# Patient Record
Sex: MALE | Race: WHITE | Employment: FULL TIME | URBAN - METROPOLITAN AREA
[De-identification: names, ages, dates, MRNs, and addresses within clinical notes are randomized per-mention and may not be internally consistent; named-entity substitution may affect disease eponyms.]

---

## 2017-02-21 ENCOUNTER — OFFICE VISIT (OUTPATIENT)
Dept: FAMILY MEDICINE | Facility: CLINIC | Age: 57
End: 2017-02-21
Payer: COMMERCIAL

## 2017-02-21 VITALS
OXYGEN SATURATION: 98 % | SYSTOLIC BLOOD PRESSURE: 116 MMHG | DIASTOLIC BLOOD PRESSURE: 80 MMHG | HEART RATE: 69 BPM | TEMPERATURE: 97.9 F | WEIGHT: 185 LBS | HEIGHT: 73 IN | RESPIRATION RATE: 20 BRPM | BODY MASS INDEX: 24.52 KG/M2

## 2017-02-21 DIAGNOSIS — I47.10 SVT (SUPRAVENTRICULAR TACHYCARDIA) (H): ICD-10-CM

## 2017-02-21 DIAGNOSIS — I10 HYPERTENSION GOAL BP (BLOOD PRESSURE) < 140/90: ICD-10-CM

## 2017-02-21 DIAGNOSIS — I10 HYPERTENSION GOAL BP (BLOOD PRESSURE) < 140/90: Primary | ICD-10-CM

## 2017-02-21 DIAGNOSIS — R12 HEARTBURN: ICD-10-CM

## 2017-02-21 DIAGNOSIS — Z00.00 ROUTINE GENERAL MEDICAL EXAMINATION AT A HEALTH CARE FACILITY: Primary | ICD-10-CM

## 2017-02-21 DIAGNOSIS — Z23 NEED FOR TDAP VACCINATION: ICD-10-CM

## 2017-02-21 DIAGNOSIS — Z13.6 CARDIOVASCULAR SCREENING; LDL GOAL LESS THAN 130: ICD-10-CM

## 2017-02-21 PROCEDURE — 80061 LIPID PANEL: CPT | Performed by: FAMILY MEDICINE

## 2017-02-21 PROCEDURE — 82043 UR ALBUMIN QUANTITATIVE: CPT | Performed by: FAMILY MEDICINE

## 2017-02-21 PROCEDURE — 80048 BASIC METABOLIC PNL TOTAL CA: CPT | Performed by: FAMILY MEDICINE

## 2017-02-21 PROCEDURE — 36415 COLL VENOUS BLD VENIPUNCTURE: CPT | Performed by: FAMILY MEDICINE

## 2017-02-21 PROCEDURE — 90715 TDAP VACCINE 7 YRS/> IM: CPT | Performed by: FAMILY MEDICINE

## 2017-02-21 PROCEDURE — 99386 PREV VISIT NEW AGE 40-64: CPT | Mod: 25 | Performed by: FAMILY MEDICINE

## 2017-02-21 PROCEDURE — 90471 IMMUNIZATION ADMIN: CPT | Performed by: FAMILY MEDICINE

## 2017-02-21 RX ORDER — DILTIAZEM HYDROCHLORIDE 300 MG/1
300 CAPSULE, COATED, EXTENDED RELEASE ORAL DAILY
Qty: 90 CAPSULE | Refills: 3 | Status: CANCELLED | OUTPATIENT
Start: 2017-02-21

## 2017-02-21 RX ORDER — DILTIAZEM HYDROCHLORIDE 300 MG/1
300 CAPSULE, COATED, EXTENDED RELEASE ORAL DAILY
Qty: 90 CAPSULE | Refills: 3 | Status: SHIPPED | OUTPATIENT
Start: 2017-02-21 | End: 2018-02-17

## 2017-02-21 RX ORDER — LISINOPRIL 30 MG/1
30 TABLET ORAL DAILY
Qty: 30 TABLET | Refills: 3 | Status: CANCELLED | OUTPATIENT
Start: 2017-02-21

## 2017-02-21 RX ORDER — LISINOPRIL 30 MG/1
30 TABLET ORAL DAILY
Qty: 90 TABLET | Refills: 3 | Status: SHIPPED | OUTPATIENT
Start: 2017-02-21 | End: 2018-02-17

## 2017-02-21 NOTE — NURSING NOTE
"Chief Complaint   Patient presents with     Recheck Medication       Initial /80 (BP Location: Right arm, Cuff Size: Adult Large)  Pulse 69  Temp 97.9  F (36.6  C) (Oral)  Resp 20  Ht 6' 0.5\" (1.842 m)  Wt 185 lb (83.9 kg)  SpO2 98%  BMI 24.75 kg/m2 Estimated body mass index is 24.75 kg/(m^2) as calculated from the following:    Height as of this encounter: 6' 0.5\" (1.842 m).    Weight as of this encounter: 185 lb (83.9 kg).  Medication Reconciliation: complete     Sneha aPng MA      "

## 2017-02-21 NOTE — MR AVS SNAPSHOT
After Visit Summary   2/21/2017    Robin Olmedo    MRN: [id number]           Patient Information     Date Of Birth          1960        Visit Information        Provider Department      2/21/2017 12:20 PM Wegener, Joel Daniel Irwin, MD [facility]        Care Instructions    Htn/svt both well controlled.      Check labs/cholesterol today.     Follow up one year for yearly physical.     Had colonoscopy age 50.      Also using one daily aspirin.     tdap today.     ASSESSMENT AND PLAN  There are no diagnoses linked to this encounter.      [facility] SIGNUP FOR E-VISITS AND EASIER COMMUNICATION:  [email or web address]     Zipnosis:  [email or web address].  Sign up for e-visits for common illnesses!     RADIOLOGY:   [facility]:  [phone] to schedule any radiology tests at [facility]: [phone]    Mammograms/colonoscopies:  [phone]    CONSUMER PRICE LINE for estimates of test costs:  [phone]               Follow-ups after your visit        Who to contact     If you have questions or need follow up information about today's clinic visit or your schedule please contact [facility] directly at [phone].  Normal or non-critical lab and imaging results will be communicated to you by Medicasthart, letter or phone within 4 business days after the clinic has received the results. If you do not hear from us within 7 days, please contact the clinic through Medicasthart or phone. If you have a critical or abnormal lab result, we will notify you by phone as soon as possible.  Submit refill requests through Designer Material or call your pharmacy and they will forward the refill request to us. Please allow 3 business days for your refill to be completed.          Additional Information About Your Visit        MedicastharTalicious Information     Designer Material lets you send messages to your doctor, view your test results, renew your prescriptions, schedule appointments and  "more. To sign up, go to [email or web address] . Click on \"Log in\" on the left side of the screen, which will take you to the Welcome page. Then click on \"Sign up Now\" on the right side of the page.     You will be asked to enter the access code listed below, as well as some personal information. Please follow the directions to create your username and password.     Your access code is: [email or web address]  Expires: 2017  1:08 PM     Your access code will  in 90 days. If you need help or a new code, please call your [facility] clinic or [phone].        Care EveryWhere ID     This is your Care EveryWhere ID. This could be used by other organizations to access your [facility] medical records  [id number]        Your Vitals Were     Pulse Temperature Respirations Height Pulse Oximetry BMI (Body Mass Index)    69 97.9  F (36.6  C) (Oral) 20 6' 0.5\" (1.842 m) 98% 24.75 kg/m2       Blood Pressure from Last 3 Encounters:   17 116/80   12/14/15 127/81   12 133/85    Weight from Last 3 Encounters:   17 185 lb (83.9 kg)   12/14/15 188 lb (85.3 kg)   12 185 lb (83.9 kg)              Today, you had the following     No orders found for display       Primary Care Provider Office Phone # Fax #    Cheo Prado [phone]       [facility] [address] [facility] [address]        Thank you!     Thank you for choosing [facility]  for your care. Our goal is always to provide you with excellent care. Hearing back from our patients is one way we can continue to improve our services. Please take a few minutes to complete the written survey that you may receive in the mail after your visit with us. Thank you!             Your Updated Medication List - Protect others around you: Learn how to safely use, store and throw away your medicines at www.disposemymeds.org.          This list is accurate as of: 17  1:09 PM.  Always use your most recent med " list.                   Brand Name Dispense Instructions for use    aspirin 81 MG chewable tablet      Take 81 mg by mouth daily Reported on 2/21/2017       diazepam 5 MG tablet    VALIUM    6 tablet    Take 1 tablet (5 mg) by mouth nightly as needed for anxiety or sleep       DILTIAZEM HCL CR PO      Take 300 mg by mouth daily Reported on 2/21/2017       docusate sodium 100 MG capsule    COLACE    100 capsule    Take 1 capsule by mouth 2 times daily.       * HYDROcodone-acetaminophen 5-325 MG per tablet    NORCO    30 tablet    Take 1-2 tablets by mouth every 4 hours as needed for other (Moderate to Severe Pain).       * HYDROcodone-acetaminophen 5-325 MG per tablet    NORCO    30 tablet    Take 1-2 tablets by mouth every 6 hours as needed.       IBUPROFEN PO      Take 600 mg by mouth every 8 hours as needed Reported on 2/21/2017       levofloxacin 500 MG tablet    LEVAQUIN    10 tablet    Take 1 tablet by mouth daily.       LISINOPRIL PO      Take 30 mg by mouth once       LORazepam 1 MG tablet    ATIVAN    12 tablet    Take 0.5-1 tablets (0.5-1 mg) by mouth every 8 hours as needed for anxiety Take 30 minutes prior to departure.  Do not operate a vehicle after taking this medication       RANITIDINE HCL PO      Take 150 mg by mouth as needed for heartburn       TYLENOL 325 MG tablet   Generic drug:  acetaminophen      Take 1-2 tablets by mouth every 6 hours as needed Reported on 2/21/2017       * Notice:  This list has 2 medication(s) that are the same as other medications prescribed for you. Read the directions carefully, and ask your doctor or other care provider to review them with you.

## 2017-02-21 NOTE — TELEPHONE ENCOUNTER
Patient was seen today and was expecting his blood pressure meds to be sent in  Is leaving [facility] tomorrow and will run out if not sent in TODAY  Meds pended,  Patient is expecting a call back when refills have been sent.  Thao Currie RN

## 2017-02-21 NOTE — TELEPHONE ENCOUNTER
Reason for Call:  Medication or medication refill:    Do you use a [facility]?  Name of the pharmacy and phone number for the current request: Pharmacy loaded     Name of the medication requested: RANITIDINE HCL PO,LISINOPRIL PO     Other request: Pt was seen today and stated Dr. Wegener was going to fill these meds. Pt was incredibly rude to writer and very demanding. Writer eventually hung up on pt. Pt stated he needs these meds filled TODAY!    Can we leave a detailed message on this number? YES    Phone number patient can be reached at: Cell number on file:    Telephone Information:   Mobile [phone]       Best Time: anytime    Call taken on 2/21/2017 at 3:51 PM by Taryn Caal

## 2017-02-21 NOTE — PATIENT INSTRUCTIONS
Htn/svt both well controlled.      Check labs/cholesterol today.     Follow up one year for yearly physical.     Had colonoscopy age 50.      Also using one daily aspirin.     tdap today.     ASSESSMENT AND PLAN  There are no diagnoses linked to this encounter.      MYCHART SIGNUP FOR E-VISITS AND EASIER COMMUNICATION:  [email or web address]     Zipnosis:  [place].[employer] [place] [employer].Contapps.  Sign up for e-visits for common illnesses!     RADIOLOGY:   [facility]:  [phone] to schedule any radiology tests at [facility]: [phone]    Mammograms/colonoscopies:  [phone]    CONSUMER PRICE LINE for estimates of test costs:  [phone]

## 2017-02-21 NOTE — NURSING NOTE
Screening Questionnaire for Adult Immunization     Are you sick today?   No    Do you have allergies to medications, food or any vaccine?   No    Have you ever had a serious reaction after receiving a vaccination?   No    Do you have a long-term health problem with heart disease, lung disease,  asthma, kidney disease, diabetes, anemia, metabolic or blood disease?   No    Do you have cancer, leukemia, AIDS, or any immune system problem?   No    Do you take cortisone, prednisone, other steroids, or anticancer drugs, or  have you had any x-ray (radiation) treatments?   No    Have you had a seizure, brain, or other nervous system problem?   No    During the past year, have you received a transfusion of blood or blood       products, or been given a medicine called immune (gamma) globulin?   No    For women: Are you pregnant or is there a chance you could become         pregnant during the next month?   No    Have you received any vaccinations in the past 4 weeks?   No     Immunization questionnaire answers were all negative.      MNVFC doesn't apply on this patient    Per orders of Dr. Wegener, injection of tdap given by Earnestine Yang. Patient instructed to remain in clinic for 20 minutes afterwards, and to report any adverse reaction to me immediately.    Prior to injection verified patient identity using patient's name and date of birth.         Screening performed by Earnestine Yang, CMA

## 2017-02-22 LAB
ANION GAP SERPL CALCULATED.3IONS-SCNC: 11 MMOL/L (ref 3–14)
BUN SERPL-MCNC: 18 MG/DL (ref 7–30)
CALCIUM SERPL-MCNC: 9.2 MG/DL (ref 8.5–10.1)
CHLORIDE SERPL-SCNC: 105 MMOL/L (ref 94–109)
CHOLEST SERPL-MCNC: 209 MG/DL
CO2 SERPL-SCNC: 24 MMOL/L (ref 20–32)
CREAT SERPL-MCNC: 0.92 MG/DL (ref 0.66–1.25)
CREAT UR-MCNC: 156 MG/DL
GFR SERPL CREATININE-BSD FRML MDRD: 85 ML/MIN/1.7M2
GLUCOSE SERPL-MCNC: 88 MG/DL (ref 70–99)
HDLC SERPL-MCNC: 75 MG/DL
LDLC SERPL CALC-MCNC: 120 MG/DL
MICROALBUMIN UR-MCNC: 12 MG/L
MICROALBUMIN/CREAT UR: 7.76 MG/G CR (ref 0–17)
NONHDLC SERPL-MCNC: 134 MG/DL
POTASSIUM SERPL-SCNC: 4.4 MMOL/L (ref 3.4–5.3)
SODIUM SERPL-SCNC: 140 MMOL/L (ref 133–144)
TRIGL SERPL-MCNC: 71 MG/DL

## 2017-02-23 PROBLEM — Z13.6 CARDIOVASCULAR SCREENING; LDL GOAL LESS THAN 130: Status: ACTIVE | Noted: 2017-02-23

## 2017-02-23 PROBLEM — R12 HEARTBURN: Status: ACTIVE | Noted: 2017-02-23

## 2017-02-23 PROBLEM — I47.10 SVT (SUPRAVENTRICULAR TACHYCARDIA) (H): Status: ACTIVE | Noted: 2017-02-23

## 2017-02-23 PROBLEM — I10 HYPERTENSION GOAL BP (BLOOD PRESSURE) < 140/90: Status: ACTIVE | Noted: 2017-02-23

## 2017-02-23 NOTE — PROGRESS NOTES
SUBJECTIVE:     CC: Robin Olmedo is an 56 year old male who presents for preventative health visit.     Healthy Habits:    Do you get at least three servings of calcium containing foods daily (dairy, green leafy vegetables, etc.)? yes    Amount of exercise or daily activities, outside of work: 6-7 day(s) per week    Problems taking medications regularly No    Medication side effects: No    Have you had an eye exam in the past two years? no    Do you see a dentist twice per year? yes    Do you have sleep apnea, excessive snoring or daytime drowsiness?no        -------------------------------------    1.  Htn:   On two medications, working well overall for him.   2.  SVT:  Previous electrocardiology evaluation and told ablation would be very unlikely to be helpful.  Symptoms quite well controlled with diltiazem and occasional valsalva.   3.  Gerd:  Infrequent ranitidine use.     Quite healthy, biking over 3, 000 miles/year now!     Works in [employer] at [employer], known to me (dr. Wegener) for long time.     Today's PHQ-2 Score:   PHQ-2 ( 1999 Pfizer) 2/21/2017 12/14/2015   Q1: Little interest or pleasure in doing things 0 0   Q2: Feeling down, depressed or hopeless 0 0   PHQ-2 Score 0 0       Abuse: Current or Past(Physical, Sexual or Emotional)- No  Do you feel safe in your environment - Yes    Social History   Substance Use Topics     Smoking status: Never Smoker     Smokeless tobacco: Not on file     Alcohol use Yes      Comment: 2 drinks Daily     The patient does not drink >3 drinks per day nor >7 drinks per week.    Last PSA: No results found for: PSA    Recent Labs   Lab Test  02/21/17   1315   CHOL  209*   HDL  75   LDL  120*   TRIG  71   NHDL  134*       Reviewed orders with patient. Reviewed health maintenance and updated orders accordingly - Yes    All Histories reviewed and updated in Epic.  Past Medical History   Diagnosis Date     Hypertension      SVT (supraventricular tachycardia) (H)       Past  "Surgical History   Procedure Laterality Date     Orthopedic surgery       Open reduction internal fixation wrist  6/17/2011     Procedure:OPEN REDUCTION INTERNAL FIXATION WRIST; Surgeon:TATIANA CASTRO; [facility]     Arthroscopy wrist  6/17/2011     Procedure:ARTHROSCOPY WRIST; Surgeon:TATIANA CASTRO; [facility]     Remove hardware wrist  1/12/2012     Procedure:REMOVE HARDWARE WRIST; Hardware Removal Left Wrist, Plate x 2, Screw  x 10 ; Surgeon:TATIANA CASTRO; Location:[facility]       ROS:  C: NEGATIVE for fever, chills, change in weight  I: NEGATIVE for worrisome rashes, moles or lesions  E: NEGATIVE for vision changes or irritation  ENT: NEGATIVE for ear, mouth and throat problems  R: NEGATIVE for significant cough or SOB  CV: NEGATIVE for chest pain, palpitations or peripheral edema  GI: NEGATIVE for nausea, abdominal pain, heartburn, or change in bowel habits   male: negative for dysuria, hematuria, decreased urinary stream, erectile dysfunction, urethral discharge  M: NEGATIVE for significant arthralgias or myalgia  N: NEGATIVE for weakness, dizziness or paresthesias  P: NEGATIVE for changes in mood or affect    Problem list, Medication list, Allergies, and Medical/Social/Surgical histories reviewed in [facility] and updated as appropriate.  OBJECTIVE:     /80 (BP Location: Right arm, Cuff Size: Adult Large)  Pulse 69  Temp 97.9  F (36.6  C) (Oral)  Resp 20  Ht 6' 0.5\" (1.842 m)  Wt 185 lb (83.9 kg)  SpO2 98%  BMI 24.75 kg/m2  EXAM:  GENERAL: healthy, alert and no distress  EYES: Eyes grossly normal to inspection, PERRL and conjunctivae and sclerae normal  HENT: ear canals and TM's normal, nose and mouth without ulcers or lesions  NECK: no adenopathy, no asymmetry, masses, or scars and thyroid normal to palpation  RESP: lungs clear to auscultation - no rales, rhonchi or wheezes  CV: regular rate and rhythm, normal S1 S2, no S3 or S4, no murmur, click or rub, no peripheral " edema and peripheral pulses strong  ABDOMEN: soft, nontender, no hepatosplenomegaly, no masses and bowel sounds normal  MS: no gross musculoskeletal defects noted, no edema  SKIN: no suspicious lesions or rashes  NEURO: Normal strength and tone, mentation intact and speech normal  PSYCH: mentation appears normal, affect normal/bright    ASSESSMENT/PLAN:       ASSESSMENT AND PLAN  1. Routine general medical examination at a health care facility  Due for tdap will do today.  Had colonoscopy (normal no polyps) at age 50.  Does not desire psa or digital exam screening, very knowledgeable about this issue. Consider second hep a if travels since we have only one on record in care everywhere.     2. Hypertension goal BP (blood pressure) < 140/90  Controlled.  Labs for eval for ckd today.  No changes, refills.   (sending prescriptions got delayed due to being entered incorrectly by rooming staff although I had told him I would send right away)  See telephone notes from today.     - Basic metabolic panel  - Albumin Random Urine Quantitative  - lisinopril (PRINIVIL,ZESTRIL) 30 MG tablet; Take 1 tablet (30 mg) by mouth daily  Dispense: 90 tablet; Refill: 3  - diltiazem (DILTIAZEM CD) 300 MG 24 hr capsule; Take 1 capsule (300 mg) by mouth daily  Dispense: 90 capsule; Refill: 3    3. Need for Tdap vaccination    - TDAP (ADACEL AGES 11-64)  - ADMIN 1st VACCINE    4. Heartburn  Refills   - ranitidine (ZANTAC) 150 MG tablet; Take 1 tablet (150 mg) by mouth 2 times daily  Dispense: 180 tablet; Refill: 3    5. SVT (supraventricular tachycardia) (H)  No changes.  If troublesome symptoms will let me know.     6. CARDIOVASCULAR SCREENING; LDL GOAL LESS THAN 130  Today.   - Lipid panel reflex to direct LDL    Discussed use of Kogn[employer], e-visits and zipnosis, signed care everywhere RELEASE OF INFORMATION.     LendstarT SIGNUP FOR E-VISITS AND EASIER COMMUNICATION:  [email or web address]     Zipnosis:  [email or web address].  Sign up  "for e-visits for common illnesses!     RADIOLOGY:   [facility]:  [phone] to schedule any radiology tests at [facility]: [phone]    Mammograms/colonoscopies:  [phone]    CONSUMER PRICE LINE for estimates of test costs:  [phone]         COUNSELING:  Reviewed preventive health counseling, as reflected in patient instructions       Regular exercise       Healthy diet/nutrition       Colon cancer screening       Prostate cancer screening         reports that he has never smoked. He does not have any smokeless tobacco history on file.    Estimated body mass index is 24.75 kg/(m^2) as calculated from the following:    Height as of this encounter: 6' 0.5\" (1.842 m).    Weight as of this encounter: 185 lb (83.9 kg).       Counseling Resources:  ATP IV Guidelines  Pooled Cohorts Equation Calculator  FRAX Risk Assessment  ICSI Preventive Guidelines  Dietary Guidelines for Americans, 2010  USDA's MyPlate  ASA Prophylaxis  Lung CA Screening    Joel Daniel Wegener, MD  [facility]  "

## 2017-06-19 DIAGNOSIS — Z71.84 TRAVEL ADVICE ENCOUNTER: Primary | ICD-10-CM

## 2017-06-19 RX ORDER — LORAZEPAM 1 MG/1
0.5-1 TABLET ORAL EVERY 8 HOURS PRN
Qty: 10 TABLET | Refills: 0 | Status: SHIPPED | OUTPATIENT
Start: 2017-06-19 | End: 2018-12-14

## 2018-07-20 DIAGNOSIS — Z29.89 ALTITUDE SICKNESS PROPHYLAXIS: Primary | ICD-10-CM

## 2018-07-20 RX ORDER — ACETAZOLAMIDE 125 MG/1
125 TABLET ORAL 2 TIMES DAILY
Qty: 30 TABLET | Refills: 1 | Status: SHIPPED | OUTPATIENT
Start: 2018-07-20 | End: 2018-12-14

## 2018-12-14 ENCOUNTER — APPOINTMENT (OUTPATIENT)
Dept: GENERAL RADIOLOGY | Facility: CLINIC | Age: 58
End: 2018-12-14
Attending: EMERGENCY MEDICINE
Payer: COMMERCIAL

## 2018-12-14 ENCOUNTER — HOSPITAL ENCOUNTER (EMERGENCY)
Facility: CLINIC | Age: 58
Discharge: HOME | End: 2018-12-14
Attending: EMERGENCY MEDICINE | Admitting: EMERGENCY MEDICINE
Payer: COMMERCIAL

## 2018-12-14 VITALS
TEMPERATURE: 98.4 F | RESPIRATION RATE: 14 BRPM | OXYGEN SATURATION: 98 % | WEIGHT: 185 LBS | DIASTOLIC BLOOD PRESSURE: 77 MMHG | BODY MASS INDEX: 24.52 KG/M2 | HEART RATE: 66 BPM | SYSTOLIC BLOOD PRESSURE: 125 MMHG | HEIGHT: 73 IN

## 2018-12-14 DIAGNOSIS — S61.411A LACERATION OF RIGHT HAND WITHOUT FOREIGN BODY, INITIAL ENCOUNTER: ICD-10-CM

## 2018-12-14 PROCEDURE — 12001 RPR S/N/AX/GEN/TRNK 2.5CM/<: CPT | Mod: Z6 | Performed by: EMERGENCY MEDICINE

## 2018-12-14 PROCEDURE — 73130 X-RAY EXAM OF HAND: CPT | Mod: RT

## 2018-12-14 PROCEDURE — 25000125 ZZHC RX 250

## 2018-12-14 PROCEDURE — 99283 EMERGENCY DEPT VISIT LOW MDM: CPT | Mod: 25 | Performed by: EMERGENCY MEDICINE

## 2018-12-14 PROCEDURE — 99283 EMERGENCY DEPT VISIT LOW MDM: CPT

## 2018-12-14 PROCEDURE — 12001 RPR S/N/AX/GEN/TRNK 2.5CM/<: CPT

## 2018-12-14 RX ORDER — LIDOCAINE HYDROCHLORIDE AND EPINEPHRINE 10; 10 MG/ML; UG/ML
INJECTION, SOLUTION INFILTRATION; PERINEURAL
Status: COMPLETED
Start: 2018-12-14 | End: 2018-12-14

## 2018-12-14 RX ORDER — MAGNESIUM HYDROXIDE 1200 MG/15ML
LIQUID ORAL
Status: COMPLETED
Start: 2018-12-14 | End: 2018-12-14

## 2018-12-14 RX ADMIN — HYDROCODONE BITARTRATE AND ACETAMINOPHEN 100 ML: 500; 5 TABLET ORAL at 22:04

## 2018-12-14 RX ADMIN — LIDOCAINE HYDROCHLORIDE AND EPINEPHRINE: 10; 10 INJECTION, SOLUTION INFILTRATION; PERINEURAL at 22:04

## 2018-12-14 ASSESSMENT — ENCOUNTER SYMPTOMS: WOUND: 1

## 2018-12-14 ASSESSMENT — MIFFLIN-ST. JEOR: SCORE: 1708.03

## 2018-12-14 NOTE — ED AVS SNAPSHOT
[facility], [place], Emergency Department  [address] [address]  Phone:  [phone]                                    Robin Olmedo   MRN: [id number]    Department:  [facility], Emergency Department   Date of Visit:  12/14/2018           After Visit Summary Signature Page    I have received my discharge instructions, and my questions have been answered. I have discussed any challenges I see with this plan with the nurse or doctor.    ..........................................................................................................................................  Patient/Patient Representative Signature      ..........................................................................................................................................  Patient Representative Print Name and Relationship to Patient    ..................................................               ................................................  Date                                   Time    ..........................................................................................................................................  Reviewed by Signature/Title    ...................................................              ..............................................  Date                                               Time          22EPIC Rev 08/18

## 2018-12-15 NOTE — ED PROVIDER NOTES
History     Chief Complaint   Patient presents with     Laceration     HPI  Robin Olmedo is a 58 year old male with a history of hypertension and SVT, who presents with a right hand injury. He had a mechanical slip and fall on ice while holding a wine bottle. Landing on his right outstretched hand. He has pain, soft tissue swelling and a V-shaped laceration to the palmar surface of the thenar eminence. Slow persistent bleeding from the wound. The hand is neurovascularly normal. He uses 81 mg aspirin, but no other anticoagulants, he is not immunosuppressed.      This part of the medical record was transcribed by Leyda Mayberry Medical Scribe.    I have reviewed the Medications, Allergies, Past Medical and Surgical History, and Social History in the [employer] system.    Past Medical History:   Diagnosis Date     Hypertension      SVT (supraventricular tachycardia) (H)        Past Surgical History:   Procedure Laterality Date     ARTHROSCOPY WRIST  6/17/2011    Procedure:ARTHROSCOPY WRIST; Surgeon:TATIANA CASTRO; Location: OR     OPEN REDUCTION INTERNAL FIXATION WRIST  6/17/2011    Procedure:OPEN REDUCTION INTERNAL FIXATION WRIST; Surgeon:TATIANA CASTRO; Location: OR     ORTHOPEDIC SURGERY       REMOVE HARDWARE WRIST  1/12/2012    Procedure:REMOVE HARDWARE WRIST; Hardware Removal Left Wrist, Plate x 2, Screw  x 10 ; Surgeon:TATIANA CASTRO; Location: OR       Family History   Problem Relation Age of Onset     Arrhythmia Mother      Arrhythmia Father        Social History     Tobacco Use     Smoking status: Never Smoker     Smokeless tobacco: Never Used   Substance Use Topics     Alcohol use: Yes     Comment: 2 drinks Daily       Review of Systems   Musculoskeletal:        Positive for right hand pain.   Skin: Positive for wound (laceration of palmar right hand).   Allergic/Immunologic: Negative for immunocompromised state.   All other systems reviewed and are negative.      Physical  "Exam   BP: 132/83  Pulse: 65  Temp: 98.4  F (36.9  C)  Resp: 20  Height: 185.4 cm (6' 1\")  Weight: 83.9 kg (185 lb)  SpO2: 99 %      Physical Exam   Gen:A&Ox3, no acute distress  Ext: + radial pulse and normal hand perfusion. 1.5cm V-shaped laceration to the right thenar eminence with slow venous oozing. No visible foreign bodies. No tendons visible in the base of the wound.   Neuro: Hand motor function normal. Sensation intact throughout the hand and distal to the wound.     ED Course        Laceration repair  Date/Time: 12/15/2018 1:37 AM  Performed by: eBrna Blair MD  Authorized by: Berna Blair MD   Consent: Verbal consent obtained.  Body area: upper extremity  Location details: right hand  Laceration length: 1.5 cm  Foreign bodies: no foreign bodies  Tendon involvement: none  Nerve involvement: none  Vascular damage: no  Anesthesia: local infiltration    Anesthesia:  Local Anesthetic: lidocaine 1% with epinephrine  Anesthetic total: 1.5 mL  Preparation: Patient was prepped and draped in the usual sterile fashion.  Irrigation solution: saline  Irrigation method: syringe  Amount of cleaning: standard  Debridement: none  Skin closure: 5-0 nylon  Number of sutures: 3  Technique: simple  Approximation: close  Approximation difficulty: simple  Dressing: antibiotic ointment, 4x4 sterile gauze and gauze roll  Patient tolerance: Patient tolerated the procedure well with no immediate complications                   Critical Care time:  none             Labs Ordered and Resulted from Time of ED Arrival Up to the Time of Departure from the ED - No data to display         Assessments & Plan (with Medical Decision Making)     This is a 58-year-old male presenting with a right hand injury after falling while holding a wine bottle. X-ray of the hand without acute fractures or glass foreign bodies. Td updated in 2017.    Wound was irrigated, explored and repaired as described in the procedure note above. "   Suture removal in 10 days.   Home wound care and return precautions and reviewed.    This part of the medical record was transcribed by Chris Camejo Scribkristine.    I have reviewed the nursing notes.    I have reviewed the findings, diagnosis, plan and need for follow up with the patient.       Medication List      There are no discharge medications for this visit.         Final diagnoses:   Laceration of right hand without foreign body, initial encounter       12/14/2018   [facility], [place], EMERGENCY DEPARTMENT    MD REA Nguyen Katrina Anne, MD  12/15/18 0141

## 2018-12-15 NOTE — DISCHARGE INSTRUCTIONS
Thank you for coming to the [facility] Emergency Department.         *LACERATION (All: sutures, staples, tape, glue)  A laceration is a cut through the skin. This will usually require stitches (sutures) or staples if it is deep. Minor cuts may be treated with a tape closure (?Steri-Strips?) or Dermabond skin glue.    HOME CARE:  EXTREMITY, FACE or TRUNK WOUNDS: Keep the wound clean and dry. If a bandage was applied and it becomes wet or dirty, replace it. Otherwise, leave it in place for the first 24 hours.  If stitches or staples were used, clean the wound daily. Protect the wound from sunlight and tanning lamps.  After removing the bandage, wash the area with soap and water. Use a wet cotton swab (Q tip) to loosen and remove any blood or crust that forms.  After cleaning, apply a thin layer of Polysporin or Bacitracin ointment. This will keep the wound clean and make it easier to remove the stitches or staples. Reapply a fresh bandage.  You may remove the bandage to shower as usual after the first 24 hours, but do not soak the area in water (no swimming) until the stitches or staples are removed.  If Steri-Strips were used, keep the area clean and dry. If it becomes wet, blot it dry with a towel. It is okay to take a brief shower, but avoid scrubbing the area.  If Dermabond skin adhesive was used, do not scratch, rub or pick at the adhesive film. Do not place tape directly over the film. Do not apply liquid, ointment or creams to the wound while the film is in place. Do not clean the wound with peroxide and do not apply ointments. Avoid activities that cause heavy sweating until the film has fallen off. Protect the wound from prolonged exposure to sunlight or tanning lamps. You may shower as usual but do not soak the wound in water (no baths or swimming). The film will fall off by itself in 5-10 days.  SCALP WOUNDS: During the first two days, you may carefully rinse your hair in the  shower to remove blood, glass or dirt particles. After two days, you may shower and shampoo your hair normally. Do not soak your scalp in the tub or go swimming until the stitches or staples have been removed.  MOUTH WOUNDS: Eat soft foods to reduce pain. If the cut is inside of your mouth, clean by rinsing after each meal and at bedtime with a mixture of equal parts water and Hydrogen Peroxide (do not swallow!). Or, you can use a cotton swab to directly apply Hydrogen Peroxide onto the cut.  You may use acetaminophen (Tylenol) 650-1000 mg every 6 hours or ibuprofen (Motrin, Advil) 600 mg every 6-8 hours with food to control pain, if you are able to take these medicines. [NOTE: If you have chronic liver or kidney disease or ever had a stomach ulcer or GI bleeding, talk with your doctor before using these medicines.]  Use sunscreen on the area for 6 months after the wound heals to keep the scar from getting darker.   FOLLOW UP: Suture removal in 10 days.   GET PROMPT MEDICAL ATTENTION if any of the following occur:  Increasing pain in the wound  Redness, swelling or pus coming from the wound  Fever over 101 F (38.3 C) oral  If stitches or staples come apart or fall out or if Steri-Strips fall off before seven days  If the wound edges re-open  Bleeding not controlled by direct pressure    7453-8472 [employer], [address] [place] [address], [place] [address]. All rights reserved. This information is not intended as a substitute for professional medical care. Always follow your healthcare professional's instructions.

## 2018-12-15 NOTE — ED TRIAGE NOTES
Patient arrived to triage after falling while carrying a bottle of wine while heading into a party. Patient has a laceration to his right hand as a result of the fall. States that he might have some glass left in his wound and wanted a doctor to assess if he would require stitches. VSS.

## 2019-02-11 ENCOUNTER — MYC MEDICAL ADVICE (OUTPATIENT)
Dept: FAMILY MEDICINE | Facility: CLINIC | Age: 59
End: 2019-02-11

## 2019-02-11 DIAGNOSIS — L11.1 TRANSIENT ACANTHOLYTIC DERMATOSIS (GROVER): Primary | ICD-10-CM

## 2019-02-11 DIAGNOSIS — R12 HEARTBURN: ICD-10-CM

## 2019-02-11 DIAGNOSIS — I10 HYPERTENSION GOAL BP (BLOOD PRESSURE) < 140/90: ICD-10-CM

## 2019-02-11 RX ORDER — DILTIAZEM HYDROCHLORIDE 300 MG/1
300 CAPSULE, COATED, EXTENDED RELEASE ORAL DAILY
Qty: 90 CAPSULE | Refills: 1 | Status: SHIPPED | OUTPATIENT
Start: 2019-02-11 | End: 2019-05-28

## 2019-02-11 RX ORDER — CLOBETASOL PROPIONATE 0.5 MG/G
CREAM TOPICAL 2 TIMES DAILY
Qty: 60 G | Refills: 3 | Status: SHIPPED | OUTPATIENT
Start: 2019-02-11 | End: 2020-02-11

## 2019-02-11 RX ORDER — LISINOPRIL 30 MG/1
30 TABLET ORAL DAILY
Qty: 90 TABLET | Refills: 1 | Status: SHIPPED | OUTPATIENT
Start: 2019-02-11 | End: 2019-05-28

## 2019-02-15 DIAGNOSIS — I10 HYPERTENSION GOAL BP (BLOOD PRESSURE) < 140/90: ICD-10-CM

## 2019-02-15 NOTE — TELEPHONE ENCOUNTER
"Requested Prescriptions   Pending Prescriptions Disp Refills     CARTIA  MG 24 hr capsule [Pharmacy Med Name: CARTIA (DILTIAZEM) 300MGXT CAPS]  Last Written Prescription Date:  2/11/19  Last Fill Quantity: 90 CAPSULE,  # refills: 1   Last office visit: 2/21/17 with prescribing provider:  WEGENER   Future Office Visit:     90 capsule 0     Sig: TAKE 1 CAPSULE(300 MG) BY MOUTH DAILY    Calcium Channel Blockers Protocol  Failed - 2/15/2019  3:20 AM       Failed - Normal ALT in past 12 months    Recent Labs   Lab Test 11/21/12  0734   ALT 35            Failed - Recent (12 mo) or future (30 days) visit within the authorizing provider's specialty    Patient had office visit in the last 12 months or has a visit in the next 30 days with authorizing provider or within the authorizing provider's specialty.  See \"Patient Info\" tab in inbasket, or \"Choose Columns\" in Meds & Orders section of the refill encounter.             Failed - Normal serum creatinine on file in past 12 months    Recent Labs   Lab Test 02/21/17  1315   CR 0.92            Passed - Blood pressure under 140/90 in past 12 months    BP Readings from Last 3 Encounters:   12/14/18 125/77   02/21/17 116/80   12/14/15 127/81                Passed - Medication is active on med list       Passed - Patient is age 18 or older        lisinopril (PRINIVIL/ZESTRIL) 30 MG tablet [Pharmacy Med Name: LISINOPRIL 30MG TABLETS]  Last Written Prescription Date:  2/11/19  Last Fill Quantity: 90 TABLET,  # refills: 1   Last office visit: 2/21/17 with prescribing provider:  WEGENER   Future Office Visit:     90 tablet 0     Sig: TAKE 1 TABLET(30 MG) BY MOUTH DAILY    ACE Inhibitors (Including Combos) Protocol Failed - 2/15/2019  3:20 AM       Failed - Recent (12 mo) or future (30 days) visit within the authorizing provider's specialty    Patient had office visit in the last 12 months or has a visit in the next 30 days with authorizing provider or within the authorizing " "provider's specialty.  See \"Patient Info\" tab in inbasket, or \"Choose Columns\" in Meds & Orders section of the refill encounter.             Failed - Normal serum creatinine on file in past 12 months    Recent Labs   Lab Test 02/21/17  1315   CR 0.92            Failed - Normal serum potassium on file in past 12 months    Recent Labs   Lab Test 02/21/17  1315   POTASSIUM 4.4            Passed - Blood pressure under 140/90 in past 12 months    BP Readings from Last 3 Encounters:   12/14/18 125/77   02/21/17 116/80   12/14/15 127/81                Passed - Medication is active on med list       Passed - Patient is age 18 or older          "

## 2019-02-18 RX ORDER — DILTIAZEM HYDROCHLORIDE 300 MG/1
CAPSULE, EXTENDED RELEASE ORAL
Qty: 90 CAPSULE | Refills: 0 | OUTPATIENT
Start: 2019-02-18

## 2019-02-18 RX ORDER — LISINOPRIL 30 MG/1
TABLET ORAL
Qty: 90 TABLET | Refills: 0 | OUTPATIENT
Start: 2019-02-18

## 2019-05-28 ENCOUNTER — OFFICE VISIT (OUTPATIENT)
Dept: FAMILY MEDICINE | Facility: CLINIC | Age: 59
End: 2019-05-28
Payer: COMMERCIAL

## 2019-05-28 VITALS
TEMPERATURE: 98.4 F | DIASTOLIC BLOOD PRESSURE: 86 MMHG | HEART RATE: 56 BPM | BODY MASS INDEX: 25.12 KG/M2 | OXYGEN SATURATION: 100 % | HEIGHT: 73 IN | WEIGHT: 189.5 LBS | RESPIRATION RATE: 18 BRPM | SYSTOLIC BLOOD PRESSURE: 136 MMHG

## 2019-05-28 DIAGNOSIS — N52.9 ERECTILE DYSFUNCTION, UNSPECIFIED ERECTILE DYSFUNCTION TYPE: ICD-10-CM

## 2019-05-28 DIAGNOSIS — I47.10 SVT (SUPRAVENTRICULAR TACHYCARDIA) (H): ICD-10-CM

## 2019-05-28 DIAGNOSIS — Z13.6 CARDIOVASCULAR SCREENING; LDL GOAL LESS THAN 130: ICD-10-CM

## 2019-05-28 DIAGNOSIS — I10 HYPERTENSION GOAL BP (BLOOD PRESSURE) < 140/90: Primary | ICD-10-CM

## 2019-05-28 DIAGNOSIS — R12 HEARTBURN: ICD-10-CM

## 2019-05-28 PROCEDURE — 80061 LIPID PANEL: CPT | Performed by: FAMILY MEDICINE

## 2019-05-28 PROCEDURE — 99214 OFFICE O/P EST MOD 30 MIN: CPT | Performed by: FAMILY MEDICINE

## 2019-05-28 PROCEDURE — 82043 UR ALBUMIN QUANTITATIVE: CPT | Performed by: FAMILY MEDICINE

## 2019-05-28 PROCEDURE — 36415 COLL VENOUS BLD VENIPUNCTURE: CPT | Performed by: FAMILY MEDICINE

## 2019-05-28 PROCEDURE — 80053 COMPREHEN METABOLIC PANEL: CPT | Performed by: FAMILY MEDICINE

## 2019-05-28 RX ORDER — LISINOPRIL 30 MG/1
30 TABLET ORAL DAILY
Qty: 90 TABLET | Refills: 1 | Status: SHIPPED | OUTPATIENT
Start: 2019-05-28 | End: 2020-05-13

## 2019-05-28 RX ORDER — DILTIAZEM HYDROCHLORIDE 300 MG/1
300 CAPSULE, COATED, EXTENDED RELEASE ORAL DAILY
Qty: 90 CAPSULE | Refills: 3 | Status: CANCELLED | OUTPATIENT
Start: 2019-05-28

## 2019-05-28 RX ORDER — SILDENAFIL CITRATE 20 MG/1
TABLET ORAL
Qty: 20 TABLET | Refills: 3 | Status: SHIPPED | OUTPATIENT
Start: 2019-05-28 | End: 2020-07-30

## 2019-05-28 RX ORDER — LISINOPRIL 30 MG/1
30 TABLET ORAL DAILY
Qty: 90 TABLET | Refills: 3 | Status: CANCELLED | OUTPATIENT
Start: 2019-05-28

## 2019-05-28 RX ORDER — DILTIAZEM HYDROCHLORIDE 300 MG/1
300 CAPSULE, COATED, EXTENDED RELEASE ORAL DAILY
Qty: 90 CAPSULE | Refills: 1 | Status: SHIPPED | OUTPATIENT
Start: 2019-05-28 | End: 2019-08-26

## 2019-05-28 ASSESSMENT — MIFFLIN-ST. JEOR: SCORE: 1728.45

## 2019-05-28 NOTE — PROGRESS NOTES
"Subjective     Robin Olmedo is a 58 year old male who presents to clinic today for the following health issues:    HPI   Hypertension Follow-up      Do you check your blood pressure regularly outside of the clinic? No     Are you following a low salt diet? No    Are your blood pressures ever more than 140 on the top number (systolic) OR more   than 90 on the bottom number (diastolic), for example 140/90? No    Amount of exercise or physical activity: 4-5 days/week for an average of 45-60 minutes    Problems taking medications regularly: No    Medication side effects: sometimes swollen ankles     Diet: regular (no restrictions)         Hypertension goal BP (blood pressure) < 140/90: home checks resting about 130s/80s.  Wondering my opinion if should be lower based on guideline changes.  Continues diltiazem and lisinopril as in med list.  Took today.  Slight ankle swelling he attributes to diltiazem.  Slight exercise intolerance (does endurance biking) without being able to get pulse above 120.   Heartburn: overall controlled.   SVT (supraventricular tachycardia) (H): gets small episodes daily and does valsalva.  Not with heavy exertion in fact this helps it.    Starting to worry though that if developed a-fib would not be able to tell the difference.   CARDIOVASCULAR SCREENING; LDL GOAL LESS THAN 130  Erectile dysfunction, unspecified erectile dysfunction type :fairly new issue over past 3-6 months even when \"in the mood.\"  Feels likely partially physical and partially emotional with aging and h/o spouse with breast cancer treatment/mastectomy/decreased libido as well. Happy however in loving relationship.     Relatively stressful new position as  .         Problem list, Medication list, Allergies, and Medical/Social/Surgical histories reviewed in [facility] and updated as appropriate.  Labs reviewed in EPIC  BP Readings from Last 3 Encounters:   05/28/19 136/86   12/14/18 125/77   02/21/17 116/80    Wt " "Readings from Last 3 Encounters:   05/28/19 86 kg (189 lb 8 oz)   12/14/18 83.9 kg (185 lb)   02/21/17 83.9 kg (185 lb)                  Patient Active Problem List   Diagnosis     Pneumonia     SVT (supraventricular tachycardia) (H)     CARDIOVASCULAR SCREENING; LDL GOAL LESS THAN 130     Hypertension goal BP (blood pressure) < 140/90     Heartburn     Transient acantholytic dermatosis (hong)     Past Surgical History:   Procedure Laterality Date     ARTHROSCOPY WRIST  6/17/2011    Procedure:ARTHROSCOPY WRIST; Surgeon:TATIANA CASTRO; Location: OR     OPEN REDUCTION INTERNAL FIXATION WRIST  6/17/2011    Procedure:OPEN REDUCTION INTERNAL FIXATION WRIST; Surgeon:TATIANA CASTRO; Location: OR     ORTHOPEDIC SURGERY       REMOVE HARDWARE WRIST  1/12/2012    Procedure:REMOVE HARDWARE WRIST; Hardware Removal Left Wrist, Plate x 2, Screw  x 10 ; Surgeon:TATIANA CASTRO; Location: OR       Social History     Tobacco Use     Smoking status: Never Smoker     Smokeless tobacco: Never Used   Substance Use Topics     Alcohol use: Yes     Comment: 2 drinks Daily     Family History   Problem Relation Age of Onset     Arrhythmia Mother      Arrhythmia Father          Current Outpatient Medications   Medication Sig Dispense Refill     aspirin 81 MG chewable tablet Take 81 mg by mouth daily Reported on 2/21/2017       diltiazem ER COATED BEADS (DILTIAZEM CD) 300 MG 24 hr capsule Take 1 capsule (300 mg) by mouth daily 90 capsule 1     lisinopril (PRINIVIL/ZESTRIL) 30 MG tablet Take 1 tablet (30 mg) by mouth daily 90 tablet 1     sildenafil (REVATIO) 20 MG tablet One to three tablets daily as needed. 20 tablet 3     clobetasol (TEMOVATE) 0.05 % external cream Apply topically 2 times daily For up to two weeks at a time for \"hong's\" rash (Patient not taking: Reported on 5/28/2019) 60 g 3     No Known Allergies  Recent Labs   Lab Test 05/28/19  1536 02/21/17  1315 11/21/12  0734 11/19/12  2007   LDL " "105* 120*  --   --    HDL 89 75  --   --    TRIG 69 71  --   --    ALT 26  --  35 42   CR 0.98 0.92 0.70 0.70   GFRESTIMATED 84 85 >90 >90   GFRESTBLACK >90 >90   GFR Calc   >90 >90   POTASSIUM 4.3 4.4 3.7 3.5        ROS:  Constitutional, HEENT, cardiovascular, pulmonary, GI, , musculoskeletal, neuro, skin, endocrine and psych systems are negative, except as otherwise noted.        OBJECTIVE:  /86 (BP Location: Left arm, Patient Position: Sitting, Cuff Size: Adult Regular)   Pulse 56   Temp 98.4  F (36.9  C) (Oral)   Resp 18   Ht 1.854 m (6' 1\")   Wt 86 kg (189 lb 8 oz)   SpO2 100%   BMI 25.00 kg/m      EXAM:  GENERAL APPEARANCE: healthy, alert and no distress  Clear speech.   RESP: lungs clear to auscultation - no rales, rhonchi or wheezes  CV: regular rates and rhythm, normal S1 S2, no S3 or S4 and no murmur, click or rub -  ABDOMEN:  soft, nontender, no HSM or masses and bowel sounds normal      ASSESSMENT AND PLAN  Patient Instructions   ASSESSMENT AND PLAN  1. Hypertension goal BP (blood pressure) < 130/80  Moderately contrlled.  I would agree with goal of less than 130/80 based on current evidence for healthy adult.     Labs today.  Will check at home and we will consider increasing lisinopril to 40mg daily.  He wants to wait a bit longer and monitor.   - Comprehensive metabolic panel  - Albumin Random Urine Quantitative with Creat Ratio      3. SVT (supraventricular tachycardia) (H)  Plan ziopatch for monitoring and to exclude a-fib.  Consider electrophysiology referral depending on results. Recommended/declined referral at this time.     Certainly if we were able to decrease diltiazem may have better exercise tolerance and less leg swelling.     Perhaps ablation therapy could be considered.       - Zio Patch Holter Adult Pediatric Greater than 48 hrs; Future    4. CARDIOVASCULAR SCREENING; LDL GOAL LESS THAN 130  Check today.   - Lipid panel reflex to direct LDL Fasting    5. " Erectile dysfunction, unspecified erectile dysfunction type  Trial generic sildenafil. (off-label and likely not covered.)     - sildenafil (REVATIO) 20 MG tablet; One to three tablets daily as needed.  Dispense: 20 tablet; Refill: 3    Follow up end of fall for yearly physical and symptom re-check, earlier as needed.           Joel Wegener, MD

## 2019-05-28 NOTE — PATIENT INSTRUCTIONS
"ASSESSMENT AND PLAN  1. Hypertension goal BP (blood pressure) < 130/80  Moderately contrlled.      Labs today.  Will check at home and we will consider increasing lisinopril to 40mg daily.   - Comprehensive metabolic panel  - Albumin Random Urine Quantitative with Creat Ratio      3. SVT (supraventricular tachycardia) (H)  Plan ziopatch for monitoring and to exclude a-fib.  Consider electrophysiology referral depending on results.   - Zio Patch Holter Adult Pediatric Greater than 48 hrs; Future    4. CARDIOVASCULAR SCREENING; LDL GOAL LESS THAN 130  Check today.   - Lipid panel reflex to direct LDL Fasting    5. Erectile dysfunction, unspecified erectile dysfunction type  Trial generic sildenafil.     - sildenafil (REVATIO) 20 MG tablet; One to three tablets daily as needed.  Dispense: 20 tablet; Refill: 3    Follow up end of  for yearly physical.       MYCHART FOR ON-LINE CARE(VISITS), LABS, REFILLS, MESSAGING, ETC [email or web address] [phone]    E-VISIT: click \"on-line care, then request e-visit\".  E-visits work well for following up on issues we have discussed in clinic previously which may need new prescriptions, new prescriptions or substantial discussion. These are always done by me (Dr. Wegener).     ONCARE VISIT:  [email or web address]  - we treat nearly 50 common conditions through on-care.  These are done in an hour by on-call staff.     RADIOLOGY:  [facility]:  [phone]   [facility]: [phone]    Mammogram and Colonoscopy [phone]    Smoking Cessation: www.[email or web address].org, 2-073-429-PLAN ([phone])      CONSUMER PRICE LINE for estimates of test costs:  [phone]       "

## 2019-05-29 LAB
ALBUMIN SERPL-MCNC: 3.8 G/DL (ref 3.4–5)
ALP SERPL-CCNC: 70 U/L (ref 40–150)
ALT SERPL W P-5'-P-CCNC: 26 U/L (ref 0–70)
ANION GAP SERPL CALCULATED.3IONS-SCNC: 8 MMOL/L (ref 3–14)
AST SERPL W P-5'-P-CCNC: 25 U/L (ref 0–45)
BILIRUB SERPL-MCNC: 0.6 MG/DL (ref 0.2–1.3)
BUN SERPL-MCNC: 15 MG/DL (ref 7–30)
CALCIUM SERPL-MCNC: 8.7 MG/DL (ref 8.5–10.1)
CHLORIDE SERPL-SCNC: 107 MMOL/L (ref 94–109)
CHOLEST SERPL-MCNC: 208 MG/DL
CO2 SERPL-SCNC: 25 MMOL/L (ref 20–32)
CREAT SERPL-MCNC: 0.98 MG/DL (ref 0.66–1.25)
CREAT UR-MCNC: 76 MG/DL
GFR SERPL CREATININE-BSD FRML MDRD: 84 ML/MIN/{1.73_M2}
GLUCOSE SERPL-MCNC: 90 MG/DL (ref 70–99)
HDLC SERPL-MCNC: 89 MG/DL
LDLC SERPL CALC-MCNC: 105 MG/DL
MICROALBUMIN UR-MCNC: <5 MG/L
MICROALBUMIN/CREAT UR: NORMAL MG/G CR (ref 0–17)
NONHDLC SERPL-MCNC: 119 MG/DL
POTASSIUM SERPL-SCNC: 4.3 MMOL/L (ref 3.4–5.3)
PROT SERPL-MCNC: 7.1 G/DL (ref 6.8–8.8)
SODIUM SERPL-SCNC: 140 MMOL/L (ref 133–144)
TRIGL SERPL-MCNC: 69 MG/DL

## 2019-07-22 ENCOUNTER — MEDICAL CORRESPONDENCE (OUTPATIENT)
Dept: CARDIOLOGY | Facility: CLINIC | Age: 59
End: 2019-07-22
Payer: COMMERCIAL

## 2019-07-22 ENCOUNTER — ANCILLARY PROCEDURE (OUTPATIENT)
Dept: CARDIOLOGY | Facility: CLINIC | Age: 59
End: 2019-07-22
Attending: FAMILY MEDICINE
Payer: COMMERCIAL

## 2019-07-22 DIAGNOSIS — I47.10 SVT (SUPRAVENTRICULAR TACHYCARDIA) (H): ICD-10-CM

## 2019-07-22 PROCEDURE — 93227 XTRNL ECG REC<48 HR R&I: CPT | Performed by: INTERNAL MEDICINE

## 2019-07-22 PROCEDURE — 0296T ZIO PATCH HOLTER ADULT PEDIATRIC GREATER THAN 48 HRS: CPT | Mod: ZF

## 2019-07-22 NOTE — PROGRESS NOTES
Per Dr. Wegener, patient to have 14 days ziopatch monitor placed.  Diagnosis: SVT  Monitor placed: Yes  Patient Instructed: Yes  Patient verbalized understanding: Yes  Holter # [id number]  Placed by: Jazmin Michele CMA

## 2019-08-25 PROBLEM — I48.0 PAROXYSMAL ATRIAL FIBRILLATION (H): Status: ACTIVE | Noted: 2019-08-25

## 2019-08-26 ENCOUNTER — TELEPHONE (OUTPATIENT)
Dept: FAMILY MEDICINE | Facility: CLINIC | Age: 59
End: 2019-08-26

## 2019-08-26 ENCOUNTER — MYC MEDICAL ADVICE (OUTPATIENT)
Dept: FAMILY MEDICINE | Facility: CLINIC | Age: 59
End: 2019-08-26

## 2019-08-26 DIAGNOSIS — I10 HYPERTENSION GOAL BP (BLOOD PRESSURE) < 140/90: ICD-10-CM

## 2019-08-26 RX ORDER — LISINOPRIL 40 MG/1
40 TABLET ORAL DAILY
Qty: 90 TABLET | Refills: 3 | Status: SHIPPED | OUTPATIENT
Start: 2019-08-26 | End: 2020-05-13

## 2019-08-26 RX ORDER — DILTIAZEM HYDROCHLORIDE 300 MG/1
300 CAPSULE, COATED, EXTENDED RELEASE ORAL DAILY
Qty: 90 CAPSULE | Refills: 0 | Status: SHIPPED | OUTPATIENT
Start: 2019-08-26 | End: 2019-11-22

## 2019-08-26 NOTE — TELEPHONE ENCOUNTER
"Requested Prescriptions   Pending Prescriptions Disp Refills     diltiazem ER COATED BEADS (CARDIZEM CD/CARTIA XT) 300 MG 24 hr capsule [Pharmacy Med Name: DILTIAZEM CD 300MG CAPSULES (24 HR)] 90 capsule 0     Sig: TAKE 1 CAPSULE(300 MG) BY MOUTH DAILY  Last Written Prescription Date:  5/28/2019  Last Fill Quantity: 90capsule,  # refills: 1   Last Office Visit: 5/28/2019 Wegener  Future Office Visit:            Calcium Channel Blockers Protocol  Passed - 8/26/2019  2:26 PM        Passed - Blood pressure under 140/90 in past 12 months     BP Readings from Last 3 Encounters:   05/28/19 136/86   12/14/18 125/77   02/21/17 116/80           Passed - Normal ALT in past 12 months     Recent Labs   Lab Test 05/28/19  1536   ALT 26             Passed - Recent (12 mo) or future (30 days) visit within the authorizing provider's specialty     Patient had office visit in the last 12 months or has a visit in the next 30 days with authorizing provider or within the authorizing provider's specialty.  See \"Patient Info\" tab in inbasket, or \"Choose Columns\" in Meds & Orders section of the refill encounter.              Passed - Medication is active on med list        Passed - Patient is age 18 or older        Passed - Normal serum creatinine on file in past 12 months     Recent Labs   Lab Test 05/28/19  1536   CR 0.98               "

## 2019-08-26 NOTE — TELEPHONE ENCOUNTER
Dr. Wegener refilled Diltiazem ER coated beads 300 mg 24 hr capsule on 5/28/19 for #90 and 1 refill.    Medication refilled as pharmacy reports not having order on record.    BRANDT Dela Cruz, SAURABHN, RN

## 2019-08-26 NOTE — TELEPHONE ENCOUNTER
Can you please resend this medication. Pt would like to pick it up tonight at the [facility] on [address]. Pharmacy states they have no record of it. Thanks!     Betty Davis  Flex Patient Rep

## 2019-08-28 RX ORDER — DILTIAZEM HYDROCHLORIDE 300 MG/1
CAPSULE, COATED, EXTENDED RELEASE ORAL
Qty: 90 CAPSULE | Refills: 0 | OUTPATIENT
Start: 2019-08-28

## 2019-08-28 NOTE — TELEPHONE ENCOUNTER
Writer notes Rx sent 08/26/2019 #90/0     Medication refused with note to pharmacy    Joann Grover, RN  Triage Nurse

## 2019-09-06 ENCOUNTER — ANCILLARY PROCEDURE (OUTPATIENT)
Dept: CARDIOLOGY | Facility: CLINIC | Age: 59
End: 2019-09-06
Attending: FAMILY MEDICINE
Payer: COMMERCIAL

## 2019-09-06 DIAGNOSIS — I48.0 PAROXYSMAL ATRIAL FIBRILLATION (H): ICD-10-CM

## 2019-09-29 ENCOUNTER — HEALTH MAINTENANCE LETTER (OUTPATIENT)
Age: 59
End: 2019-09-29

## 2019-11-22 ENCOUNTER — MYC REFILL (OUTPATIENT)
Dept: FAMILY MEDICINE | Facility: CLINIC | Age: 59
End: 2019-11-22

## 2019-11-22 DIAGNOSIS — I10 HYPERTENSION GOAL BP (BLOOD PRESSURE) < 140/90: ICD-10-CM

## 2019-11-24 RX ORDER — DILTIAZEM HYDROCHLORIDE 300 MG/1
300 CAPSULE, COATED, EXTENDED RELEASE ORAL DAILY
Qty: 90 CAPSULE | Refills: 1 | Status: SHIPPED | OUTPATIENT
Start: 2019-11-24 | End: 2020-05-13

## 2019-11-25 NOTE — TELEPHONE ENCOUNTER
"Requested Prescriptions   Pending Prescriptions Disp Refills     diltiazem ER COATED BEADS (DILTIAZEM CD) 300 MG 24 hr capsule 90 capsule 0     Sig: Take 1 capsule (300 mg) by mouth daily       Calcium Channel Blockers Protocol  Passed - 11/22/2019  9:05 AM        Passed - Blood pressure under 140/90 in past 12 months     BP Readings from Last 3 Encounters:   05/28/19 136/86   12/14/18 125/77   02/21/17 116/80                 Passed - Normal ALT in past 12 months     Recent Labs   Lab Test 05/28/19  1536   ALT 26             Passed - Recent (12 mo) or future (30 days) visit within the authorizing provider's specialty     Patient has had an office visit with the authorizing provider or a provider within the authorizing providers department within the previous 12 mos or has a future within next 30 days. See \"Patient Info\" tab in inbasket, or \"Choose Columns\" in Meds & Orders section of the refill encounter.              Passed - Medication is active on med list        Passed - Patient is age 18 or older        Passed - Normal serum creatinine on file in past 12 months     Recent Labs   Lab Test 05/28/19  1536   CR 0.98             Signed Prescriptions:                        Disp   Refills    diltiazem ER COATED BEADS (DILTIAZEM CD) 3*90 cap*1        Sig: Take 1 capsule (300 mg) by mouth daily  Authorizing Provider: WEGENER, JOEL DANIEL IRWIN  Ordering User: DENNY GERARDO      "

## 2020-02-03 NOTE — TELEPHONE ENCOUNTER
RECORDS RECEIVED FROM: Internal   DATE RECEIVED: 3-3-20   NOTES STATUS DETAILS   OFFICE NOTE from referring provider    Internal Dr. Wegener    OFFICE NOTE from other cardiologist    N/A    DISCHARGE SUMMARY from hospital    N/A    DISCHARGE REPORT from the ER   N/A    OPERATIVE REPORT    N/A    MEDICATION LIST   Internal    LABS     BMP   N/A    CBC   N/A    CMP   Internal 5-28-19   Lipids   Internal 5-28-19   TSH   N/A    DIAGNOSTIC PROCEDURES     EKG   N/A    Monitor Reports   Internal 7-22-19   IMAGING (DISC & REPORT)      Echo   Internal 9-6-19   Stress Tests   N/A    Cath   N/A    MRI/MRA   N/A    CT/CTA   N/A

## 2020-02-19 DIAGNOSIS — J11.1 INFLUENZA-LIKE ILLNESS: Primary | ICD-10-CM

## 2020-02-19 RX ORDER — OSELTAMIVIR PHOSPHATE 75 MG/1
75 CAPSULE ORAL 2 TIMES DAILY
Qty: 10 CAPSULE | Refills: 0 | Status: SHIPPED | OUTPATIENT
Start: 2020-02-19 | End: 2020-02-24

## 2020-03-03 ENCOUNTER — PRE VISIT (OUTPATIENT)
Dept: CARDIOLOGY | Facility: CLINIC | Age: 60
End: 2020-03-03

## 2020-03-03 ENCOUNTER — OFFICE VISIT (OUTPATIENT)
Dept: CARDIOLOGY | Facility: CLINIC | Age: 60
End: 2020-03-03
Attending: FAMILY MEDICINE
Payer: COMMERCIAL

## 2020-03-03 VITALS
SYSTOLIC BLOOD PRESSURE: 121 MMHG | WEIGHT: 189 LBS | HEART RATE: 64 BPM | OXYGEN SATURATION: 99 % | HEIGHT: 73 IN | DIASTOLIC BLOOD PRESSURE: 78 MMHG | BODY MASS INDEX: 25.05 KG/M2

## 2020-03-03 DIAGNOSIS — I77.810 DILATED AORTIC ROOT (H): Primary | ICD-10-CM

## 2020-03-03 DIAGNOSIS — I10 ESSENTIAL HYPERTENSION: ICD-10-CM

## 2020-03-03 DIAGNOSIS — I49.1 PAC (PREMATURE ATRIAL CONTRACTION): ICD-10-CM

## 2020-03-03 PROCEDURE — 93005 ELECTROCARDIOGRAM TRACING: CPT | Mod: ZF

## 2020-03-03 PROCEDURE — G0463 HOSPITAL OUTPT CLINIC VISIT: HCPCS | Mod: 25,ZF

## 2020-03-03 PROCEDURE — 93010 ELECTROCARDIOGRAM REPORT: CPT | Mod: ZP | Performed by: INTERNAL MEDICINE

## 2020-03-03 PROCEDURE — 99204 OFFICE O/P NEW MOD 45 MIN: CPT | Mod: ZP | Performed by: INTERNAL MEDICINE

## 2020-03-03 ASSESSMENT — MIFFLIN-ST. JEOR: SCORE: 1721.18

## 2020-03-03 ASSESSMENT — PAIN SCALES - GENERAL: PAINLEVEL: NO PAIN (0)

## 2020-03-03 NOTE — PROGRESS NOTES
I am delighted to see Robin HENLEY Shara in consultation for palpitations.     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 He recently had a ziopatch monitor and there was a concern for possible atrial fibrillation.     He has a h/o hypertension which is well-controlled. Palpitations for years - brief, rapid pounding heart beats, no associated symptoms of dizziness, chest pain, dyspnea, syncope. Symptoms occur with anxiety, after large meals/alcohol, in bed resting on left side. He's been treated with diltiazem with good relief of symptoms but continues to have brief episodes daily. Both parents and a brother have atrial fibrillation. This past summer he wore a ziopatch for less than 2 days due to tape falling off, but did have symptoms during that time. An echo was also done and showed a dilated aortic root.    He is very active, bikes/runs, never has palpitations with exercise.    The following portions of the patient's history were reviewed and updated as appropriate: allergies, current medications, past family history, past medical history, past social history, past surgical history, and the problem list.      Past Medical History:  Hypertension  Symptomatic PACs/PAT      Medications:   Aspirin 81 every day  Diltiazem  mg every day  Lisinopril 40 mg every day    Allergies:  No Known Allergies    Family History:   Mom  age 58 ovarian CA, had Afib in her 50s  Father Afib late 70s, was anticoagulated,  age 83 after a run, fell on driveway with head bleed  Brother has afib age 62  Another brother age 64 no afb    Family History   Problem Relation Age of Onset     Arrhythmia Mother      Arrhythmia Father        Psychosocial history:  reports that he has never smoked. He has never used smokeless tobacco. He reports current alcohol use. He reports that he does not use drugs.    Review of systems:   Cardiovascular: No chest pain,  shortness of breath at rest, dyspnea with exertion, orthopnea, paroxysmal nocturia dyspnea, nocturia, dizziness, syncope.    In addition,   Constitutional: No change in weight, sleep or appetite.  Normal energy.  No fever or chills  Eyes: Negative for vision changes or eye problems  ENT: No problems with ears, nose or throat.  No difficulty swallowing.  Resp: No coughing, wheezing or shortness of breath  GI: No nausea, vomiting,  heartburn, abdominal pain, diarrhea, constipation or change in bowel habits  : No urinary frequency or dysuria, bladder or kidney problems  Musculoskeletal: No significant muscle or joint pains  Neurologic: No headaches, numbness, tingling, weakness, problems with balance or coordination  Psychiatric: No problems with anxiety, depression or mental health  Heme/immune/allergy: No history of bleeding or clotting problems or anemia.  No allergies or immune system problems  Integumentary: No rashes,worrisome lesions or skin problems      Physical examination  Vitals: 121/78, 64 bpm  BMI= 25    Constitutional: In general, the patient is a pleasant male in no apparent distress.    Eyes: PERRLA.  EOMI.  Sclerae white, not injected.  ENT/mouth: Normiocephalic and atraumatic.  Nares clear.  Pharynx without erythema or exudate.  Dentition intact.  No adenopathy.  No thyromegaly. Carotids +2/2 bilaterally without bruits.  No jugular venous distension.   Card/Vasc: The PMI is in the 5th ICS in the midclavicular line. There is no heave. Regular rate and rhythm, no ectopy. Normal S1, S2. No murmur, rub, click, or gallop. Pulses are normal bilaterally throughout. No peripheral edema.  Respiratory: Clear to asculation.  No ronchi, wheezes, rales.  No dullness to percussion.   GI: Abdomen is soft, nontender, nondistended. No organomegaly. No AAA.  No bruits.   Integument: No significant bruises or rashes  Neurological: The neurological examination reveal a patient who was oriented to person, place, and  time.    Psych: Normal  Heme/Lymph/Immun: no significant adenopathy      I have reviewed the following labs/imaging:  Labs: 5/28/2019: cholesterol 208, HDL 89, , TG 69, K 4.3, cr 0.98  Echo: 9/6/2019: EF 55-60%, sinus of valsalva 4.6 cm, ascending aorta 3.9 cm, aortic valve is tricuspid, FRANSISCO 39    I have personally and independently reviewed the following:  EKG: today 3/3/2020: sinus rafita 57 bpm, normal intervals    Ziopatch 7/22-7/23/2019: sinus average 57 bpm ( bpm); Ectopic atrial rhythm longest 20 beats at 96 bpm; PACs fastest 193 bpm for 8 beats, no symptoms. Pauses usually post PACs at 4am. One episode of palpitations correlated to 4 beats of PACs.      Assessment :  1. Palpitations, associated with symptomatic PACs. No atrial fibrillation noted on ziopatch monitor. Offered reassurance. He is very aware of his symptoms, so if he has sustained irregular heart beats, or feel a pulse that is persistently higher than normal, we can get EKG for documentation. Even if he has PAF, [id number] score is 1 for hypertension, and no long term anticoagulation is recommended at this time. We did spend some time discussing possible therapeutic options should he develop Afib.  2. Moderately dilated aortic root at 4.6 cm, with tricuspid aortic valve. Recommend MR angiogram to better delineate size.  3. Hypertension. Controlled.    Plan:  As above      The patient is to return as needed . The patient understood the treatment plan as outlined above.  There were no barriers to learning.      Kathya Mendosa MD

## 2020-03-03 NOTE — LETTER
3/3/2020      RE: Robin Olmedo  [address] [facility] [address]       Dear Colleague,    Thank you for the opportunity to participate in the care of your patient, Robin Olmedo, at the [facility]. Please see a copy of my visit note below.    I am delighted to see Robin Olmedo in consultation for palpitations.     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 He recently had a ziopatch monitor and there was a concern for possible atrial fibrillation.     He has a h/o hypertension which is well-controlled. Palpitations for years - brief, rapid pounding heart beats, no associated symptoms of dizziness, chest pain, dyspnea, syncope. Symptoms occur with anxiety, after large meals/alcohol, in bed resting on left side. He's been treated with diltiazem with good relief of symptoms but continues to have brief episodes daily. Both parents and a brother have atrial fibrillation. This past summer he wore a ziopatch for less than 2 days due to tape falling off, but did have symptoms during that time. An echo was also done and showed a dilated aortic root.    He is very active, bikes/runs, never has palpitations with exercise.    The following portions of the patient's history were reviewed and updated as appropriate: allergies, current medications, past family history, past medical history, past social history, past surgical history, and the problem list.      Past Medical History:  Hypertension  Symptomatic PACs/PAT      Medications:   Aspirin 81 every day  Diltiazem  mg every day  Lisinopril 40 mg every day    Allergies:  No Known Allergies    Family History:   Mom  age 58 ovarian CA, had Afib in her 50s  Father Afib late 70s, was anticoagulated,  age 83 after a run, fell on driveway with head bleed  Brother has afib age 62  Another brother age 64 no  afb    Family History   Problem Relation Age of Onset     Arrhythmia Mother      Arrhythmia Father        Psychosocial history:  reports that he has never smoked. He has never used smokeless tobacco. He reports current alcohol use. He reports that he does not use drugs.    Review of systems:   Cardiovascular: No chest pain, shortness of breath at rest, dyspnea with exertion, orthopnea, paroxysmal nocturia dyspnea, nocturia, dizziness, syncope.    In addition,   Constitutional: No change in weight, sleep or appetite.  Normal energy.  No fever or chills  Eyes: Negative for vision changes or eye problems  ENT: No problems with ears, nose or throat.  No difficulty swallowing.  Resp: No coughing, wheezing or shortness of breath  GI: No nausea, vomiting,  heartburn, abdominal pain, diarrhea, constipation or change in bowel habits  : No urinary frequency or dysuria, bladder or kidney problems  Musculoskeletal: No significant muscle or joint pains  Neurologic: No headaches, numbness, tingling, weakness, problems with balance or coordination  Psychiatric: No problems with anxiety, depression or mental health  Heme/immune/allergy: No history of bleeding or clotting problems or anemia.  No allergies or immune system problems  Integumentary: No rashes,worrisome lesions or skin problems      Physical examination  Vitals: 121/78, 64 bpm  BMI= 25    Constitutional: In general, the patient is a pleasant male in no apparent distress.    Eyes: PERRLA.  EOMI.  Sclerae white, not injected.  ENT/mouth: Normiocephalic and atraumatic.  Nares clear.  Pharynx without erythema or exudate.  Dentition intact.  No adenopathy.  No thyromegaly. Carotids +2/2 bilaterally without bruits.  No jugular venous distension.   Card/Vasc: The PMI is in the 5th ICS in the midclavicular line. There is no heave. Regular rate and rhythm, no ectopy. Normal S1, S2. No murmur, rub, click, or gallop. Pulses are normal bilaterally throughout. No peripheral  edema.  Respiratory: Clear to asculation.  No ronchi, wheezes, rales.  No dullness to percussion.   GI: Abdomen is soft, nontender, nondistended. No organomegaly. No AAA.  No bruits.   Integument: No significant bruises or rashes  Neurological: The neurological examination reveal a patient who was oriented to person, place, and time.    Psych: Normal  Heme/Lymph/Immun: no significant adenopathy      I have reviewed the following labs/imaging:  Labs: 5/28/2019: cholesterol 208, HDL 89, , TG 69, K 4.3, cr 0.98  Echo: 9/6/2019: EF 55-60%, sinus of valsalva 4.6 cm, ascending aorta 3.9 cm, aortic valve is tricuspid, FRANSISCO 39    I have personally and independently reviewed the following:  EKG: today 3/3/2020: sinus rafita 57 bpm, normal intervals    Ziopatch 7/22-7/23/2019: sinus average 57 bpm ( bpm); Ectopic atrial rhythm longest 20 beats at 96 bpm; PACs fastest 193 bpm for 8 beats, no symptoms. Pauses usually post PACs at 4am. One episode of palpitations correlated to 4 beats of PACs.      Assessment :  1. Palpitations, associated with symptomatic PACs. No atrial fibrillation noted on ziopatch monitor. Offered reassurance. He is very aware of his symptoms, so if he has sustained irregular heart beats, or feel a pulse that is persistently higher than normal, we can get EKG for documentation. Even if he has PAF, [id number] score is 1 for hypertension, and no long term anticoagulation is recommended at this time. We did spend some time discussing possible therapeutic options should he develop Afib.  2. Moderately dilated aortic root at 4.6 cm, with tricuspid aortic valve. Recommend MR angiogram to better delineate size.  3. Hypertension. Controlled.    Plan:  As above      The patient is to return as needed . The patient understood the treatment plan as outlined above.  There were no barriers to learning.      Please do not hesitate to contact me if you have any questions/concerns.     Sincerely,     Kathya  MD Navya

## 2020-03-03 NOTE — NURSING NOTE
Chief Complaint   Patient presents with     New Patient     59yoM w/ hx HTN, PAF, & SVT presents for EP consult for SVT/Ziopatch results.     Vitals were taken and medications were reconciled. EKG was performed.    Miladys Ridley CMA    1:43 PM

## 2020-03-03 NOTE — PATIENT INSTRUCTIONS
You were seen in the Electrophysiology Clinic today by: Dr. Kathya Mendosa MD    Plan:     Labs/Tests Needed:    MRA Chest w/out Contrast    Your Care Team:  EP Cardiology   Telephone Number     Yary Clarke RN [phone]     For scheduling appts or procedures:    Kaity Rogers   [phone]   For the Device Clinic (Pacemakers, ICDs, Loop Recorders)    During business hours: [phone]  After business hours:   [phone]- select option 4 and ask for job code [id number].       Cardiovascular Clinic:   [address] [place] [address]. [place] [address]      As always, Thank you for trusting us with your health care needs!

## 2020-03-04 LAB — INTERPRETATION ECG - MUSE: NORMAL

## 2020-05-11 ENCOUNTER — MYC MEDICAL ADVICE (OUTPATIENT)
Dept: FAMILY MEDICINE | Facility: CLINIC | Age: 60
End: 2020-05-11

## 2020-05-13 ENCOUNTER — E-VISIT (OUTPATIENT)
Dept: FAMILY MEDICINE | Facility: CLINIC | Age: 60
End: 2020-05-13
Payer: COMMERCIAL

## 2020-05-13 ENCOUNTER — MYC MEDICAL ADVICE (OUTPATIENT)
Dept: FAMILY MEDICINE | Facility: CLINIC | Age: 60
End: 2020-05-13

## 2020-05-13 DIAGNOSIS — Z20.822 SUSPECTED COVID-19 VIRUS INFECTION: Primary | ICD-10-CM

## 2020-05-13 DIAGNOSIS — I10 HYPERTENSION GOAL BP (BLOOD PRESSURE) < 140/90: ICD-10-CM

## 2020-05-13 DIAGNOSIS — Z13.6 CARDIOVASCULAR SCREENING; LDL GOAL LESS THAN 130: ICD-10-CM

## 2020-05-13 PROCEDURE — 99421 OL DIG E/M SVC 5-10 MIN: CPT | Performed by: FAMILY MEDICINE

## 2020-05-13 RX ORDER — DILTIAZEM HYDROCHLORIDE 300 MG/1
300 CAPSULE, COATED, EXTENDED RELEASE ORAL DAILY
Qty: 90 CAPSULE | Refills: 3 | Status: SHIPPED | OUTPATIENT
Start: 2020-05-13 | End: 2021-05-24

## 2020-05-13 RX ORDER — LISINOPRIL 40 MG/1
40 TABLET ORAL DAILY
Qty: 90 TABLET | Refills: 3 | Status: SHIPPED | OUTPATIENT
Start: 2020-05-13 | End: 2021-05-24

## 2020-05-13 NOTE — TELEPHONE ENCOUNTER
Dr. Wegener-Please review and may close encounter.  May Aspirin be removed from active medication list?    Thank you!  SAURABH PadronN, RN

## 2020-05-14 ENCOUNTER — NURSE TRIAGE (OUTPATIENT)
Dept: NURSING | Facility: CLINIC | Age: 60
End: 2020-05-14

## 2020-05-14 NOTE — TELEPHONE ENCOUNTER
Pt is an MD calling trying to get the Location of his blood drawl changed    From ? To [facility]    Pt is a [facility] pt    I am unable to assist - [facility]  Pt given number to central scheduling     Protocol and care advice reviewed  Caller states understanding of the recommended disposition  Advised to call back if further questions or concerns    Shemar Garcia , RN / [facility]    Reason for Disposition    [1] Caller requesting NON-URGENT health information AND [2] PCP's office is the best resource    Protocols used: INFORMATION ONLY CALL-A-AH

## 2020-05-15 DIAGNOSIS — I10 HYPERTENSION GOAL BP (BLOOD PRESSURE) < 140/90: ICD-10-CM

## 2020-05-15 DIAGNOSIS — Z20.822 SUSPECTED COVID-19 VIRUS INFECTION: ICD-10-CM

## 2020-05-15 DIAGNOSIS — Z13.6 CARDIOVASCULAR SCREENING; LDL GOAL LESS THAN 130: ICD-10-CM

## 2020-05-15 PROCEDURE — 82043 UR ALBUMIN QUANTITATIVE: CPT | Performed by: FAMILY MEDICINE

## 2020-05-15 PROCEDURE — 36415 COLL VENOUS BLD VENIPUNCTURE: CPT | Performed by: FAMILY MEDICINE

## 2020-05-15 PROCEDURE — 80053 COMPREHEN METABOLIC PANEL: CPT | Performed by: FAMILY MEDICINE

## 2020-05-15 PROCEDURE — 80061 LIPID PANEL: CPT | Performed by: FAMILY MEDICINE

## 2020-05-15 PROCEDURE — 99000 SPECIMEN HANDLING OFFICE-LAB: CPT | Performed by: FAMILY MEDICINE

## 2020-05-15 PROCEDURE — 86769 SARS-COV-2 COVID-19 ANTIBODY: CPT | Mod: 90 | Performed by: FAMILY MEDICINE

## 2020-05-16 LAB
ALBUMIN SERPL-MCNC: 3.8 G/DL (ref 3.4–5)
ALP SERPL-CCNC: 78 U/L (ref 40–150)
ALT SERPL W P-5'-P-CCNC: 28 U/L (ref 0–70)
ANION GAP SERPL CALCULATED.3IONS-SCNC: 8 MMOL/L (ref 3–14)
AST SERPL W P-5'-P-CCNC: 23 U/L (ref 0–45)
BILIRUB SERPL-MCNC: 0.4 MG/DL (ref 0.2–1.3)
BUN SERPL-MCNC: 19 MG/DL (ref 7–30)
CALCIUM SERPL-MCNC: 9.5 MG/DL (ref 8.5–10.1)
CHLORIDE SERPL-SCNC: 106 MMOL/L (ref 94–109)
CHOLEST SERPL-MCNC: 216 MG/DL
CO2 SERPL-SCNC: 25 MMOL/L (ref 20–32)
CREAT SERPL-MCNC: 0.94 MG/DL (ref 0.66–1.25)
CREAT UR-MCNC: 47 MG/DL
GFR SERPL CREATININE-BSD FRML MDRD: 88 ML/MIN/{1.73_M2}
GLUCOSE SERPL-MCNC: 91 MG/DL (ref 70–99)
HDLC SERPL-MCNC: 88 MG/DL
LDLC SERPL CALC-MCNC: 119 MG/DL
MICROALBUMIN UR-MCNC: 8 MG/L
MICROALBUMIN/CREAT UR: 16.7 MG/G CR (ref 0–17)
NONHDLC SERPL-MCNC: 128 MG/DL
POTASSIUM SERPL-SCNC: 4.2 MMOL/L (ref 3.4–5.3)
PROT SERPL-MCNC: 7.4 G/DL (ref 6.8–8.8)
SODIUM SERPL-SCNC: 139 MMOL/L (ref 133–144)
TRIGL SERPL-MCNC: 45 MG/DL

## 2020-05-18 ENCOUNTER — MYC MEDICAL ADVICE (OUTPATIENT)
Dept: FAMILY MEDICINE | Facility: CLINIC | Age: 60
End: 2020-05-18

## 2020-05-18 LAB
COVID-19 SPIKE RBD ABY TITER: NORMAL
COVID-19 SPIKE RBD ABY: NEGATIVE

## 2020-05-20 DIAGNOSIS — I10 HYPERTENSION GOAL BP (BLOOD PRESSURE) < 140/90: ICD-10-CM

## 2020-05-22 RX ORDER — DILTIAZEM HYDROCHLORIDE 300 MG/1
CAPSULE, COATED, EXTENDED RELEASE ORAL
Qty: 0.1 CAPSULE | Refills: 0 | OUTPATIENT
Start: 2020-05-22

## 2020-05-22 NOTE — TELEPHONE ENCOUNTER
Duplicate see script sent 5/13/2020 diltiazem ER COATED BEADS (DILTIAZEM CD) 300 MG 24 hr capsule # 90 capsule  X  3 refills

## 2020-07-30 ENCOUNTER — MYC REFILL (OUTPATIENT)
Dept: FAMILY MEDICINE | Facility: CLINIC | Age: 60
End: 2020-07-30

## 2020-07-30 DIAGNOSIS — N52.9 ERECTILE DYSFUNCTION, UNSPECIFIED ERECTILE DYSFUNCTION TYPE: ICD-10-CM

## 2020-07-30 RX ORDER — SILDENAFIL CITRATE 20 MG/1
TABLET ORAL
Qty: 20 TABLET | Refills: 1 | Status: SHIPPED | OUTPATIENT
Start: 2020-07-30 | End: 2020-08-04

## 2020-07-30 NOTE — TELEPHONE ENCOUNTER
Erectile Dysfuction Protocol [id number]/30/2020 09:23 AM   Absence of nitrates on medication list Protocol Details    Absence of Alpha Blockers on Med list     Recent (12 mo) or future (30 days) visit within the authorizing provider's specialty     Medication is active on med list     Patient is age 18 or older

## 2020-08-04 ENCOUNTER — MYC MEDICAL ADVICE (OUTPATIENT)
Dept: FAMILY MEDICINE | Facility: CLINIC | Age: 60
End: 2020-08-04

## 2020-08-04 ENCOUNTER — TELEPHONE (OUTPATIENT)
Dept: FAMILY MEDICINE | Facility: CLINIC | Age: 60
End: 2020-08-04

## 2020-08-04 DIAGNOSIS — N52.9 ERECTILE DYSFUNCTION, UNSPECIFIED ERECTILE DYSFUNCTION TYPE: ICD-10-CM

## 2020-08-04 RX ORDER — SILDENAFIL CITRATE 20 MG/1
TABLET ORAL
Qty: 20 TABLET | Refills: 11 | Status: SHIPPED | OUTPATIENT
Start: 2020-08-04 | End: 2021-06-21

## 2020-08-04 NOTE — TELEPHONE ENCOUNTER
Routing to provider. separate TE from pharmacy was sent to the [facility] PA and I will also route to [facility] PA team.  Patient needs PA for Revatio (sildenafil)  Thao Currie RN

## 2020-08-04 NOTE — TELEPHONE ENCOUNTER
PRIOR AUTHORIZATION DENIED    Medication: SILDENAFIL CITRATE 20MG- DENIED     Denial Date: 8/4/2020    Denial Rational:       Appeal Information: If provider would like to appeal please provide a letter of medical necessity.

## 2020-08-04 NOTE — TELEPHONE ENCOUNTER
Central Prior Authorization Team  Phone: [phone]    PA Initiation    Medication: SILDENAFIL CITRATE 20MG  Insurance Company: [employer] - Phone [phone] Fax [phone]  Pharmacy Filling the Rx: [facility] - [place], [place] - [address]  Filling Pharmacy Phone: [phone]  Filling Pharmacy Fax:    Start Date: 8/4/2020

## 2021-01-14 ENCOUNTER — HEALTH MAINTENANCE LETTER (OUTPATIENT)
Age: 61
End: 2021-01-14

## 2021-02-16 ENCOUNTER — HOSPITAL ENCOUNTER (EMERGENCY)
Facility: CLINIC | Age: 61
Discharge: HOME | End: 2021-02-16
Attending: FAMILY MEDICINE | Admitting: FAMILY MEDICINE
Payer: COMMERCIAL

## 2021-02-16 ENCOUNTER — APPOINTMENT (OUTPATIENT)
Dept: CT IMAGING | Facility: CLINIC | Age: 61
End: 2021-02-16
Attending: FAMILY MEDICINE
Payer: COMMERCIAL

## 2021-02-16 ENCOUNTER — APPOINTMENT (OUTPATIENT)
Dept: ULTRASOUND IMAGING | Facility: CLINIC | Age: 61
End: 2021-02-16
Attending: FAMILY MEDICINE
Payer: COMMERCIAL

## 2021-02-16 VITALS
WEIGHT: 180 LBS | HEIGHT: 73 IN | RESPIRATION RATE: 16 BRPM | SYSTOLIC BLOOD PRESSURE: 127 MMHG | TEMPERATURE: 98.2 F | HEART RATE: 55 BPM | OXYGEN SATURATION: 100 % | BODY MASS INDEX: 23.86 KG/M2 | DIASTOLIC BLOOD PRESSURE: 90 MMHG

## 2021-02-16 DIAGNOSIS — R10.9 LEFT FLANK PAIN: ICD-10-CM

## 2021-02-16 DIAGNOSIS — R10.32 LLQ ABDOMINAL PAIN: ICD-10-CM

## 2021-02-16 DIAGNOSIS — N44.03 TORSION OF APPENDIX OF TESTIS: ICD-10-CM

## 2021-02-16 LAB
ALBUMIN SERPL-MCNC: 4.6 G/DL (ref 3.4–5)
ALBUMIN UR-MCNC: NEGATIVE MG/DL
ALP SERPL-CCNC: 103 U/L (ref 40–150)
ALT SERPL W P-5'-P-CCNC: 30 U/L (ref 0–70)
ANION GAP SERPL CALCULATED.3IONS-SCNC: 3 MMOL/L (ref 3–14)
APPEARANCE UR: CLEAR
AST SERPL W P-5'-P-CCNC: 27 U/L (ref 0–45)
BASOPHILS # BLD AUTO: 0 10E9/L (ref 0–0.2)
BASOPHILS NFR BLD AUTO: 0.6 %
BILIRUB SERPL-MCNC: 0.7 MG/DL (ref 0.2–1.3)
BILIRUB UR QL STRIP: NEGATIVE
BUN SERPL-MCNC: 26 MG/DL (ref 7–30)
CALCIUM SERPL-MCNC: 9.4 MG/DL (ref 8.5–10.1)
CHLORIDE SERPL-SCNC: 107 MMOL/L (ref 94–109)
CO2 SERPL-SCNC: 28 MMOL/L (ref 20–32)
COLOR UR AUTO: YELLOW
CREAT SERPL-MCNC: 0.99 MG/DL (ref 0.66–1.25)
CRP SERPL-MCNC: <2.9 MG/L (ref 0–8)
DIFFERENTIAL METHOD BLD: NORMAL
EOSINOPHIL # BLD AUTO: 0.2 10E9/L (ref 0–0.7)
EOSINOPHIL NFR BLD AUTO: 2.7 %
ERYTHROCYTE [DISTWIDTH] IN BLOOD BY AUTOMATED COUNT: 11.9 % (ref 10–15)
GFR SERPL CREATININE-BSD FRML MDRD: 82 ML/MIN/{1.73_M2}
GLUCOSE SERPL-MCNC: 98 MG/DL (ref 70–99)
GLUCOSE UR STRIP-MCNC: NEGATIVE MG/DL
HCT VFR BLD AUTO: 52.5 % (ref 40–53)
HGB BLD-MCNC: 17.5 G/DL (ref 13.3–17.7)
HGB UR QL STRIP: NEGATIVE
IMM GRANULOCYTES # BLD: 0 10E9/L (ref 0–0.4)
IMM GRANULOCYTES NFR BLD: 0.1 %
KETONES UR STRIP-MCNC: NEGATIVE MG/DL
LEUKOCYTE ESTERASE UR QL STRIP: NEGATIVE
LYMPHOCYTES # BLD AUTO: 2.5 10E9/L (ref 0.8–5.3)
LYMPHOCYTES NFR BLD AUTO: 34.4 %
MCH RBC QN AUTO: 31.4 PG (ref 26.5–33)
MCHC RBC AUTO-ENTMCNC: 33.3 G/DL (ref 31.5–36.5)
MCV RBC AUTO: 94 FL (ref 78–100)
MONOCYTES # BLD AUTO: 0.7 10E9/L (ref 0–1.3)
MONOCYTES NFR BLD AUTO: 9.9 %
MUCOUS THREADS #/AREA URNS LPF: PRESENT /LPF
NEUTROPHILS # BLD AUTO: 3.7 10E9/L (ref 1.6–8.3)
NEUTROPHILS NFR BLD AUTO: 52.3 %
NITRATE UR QL: NEGATIVE
NRBC # BLD AUTO: 0 10*3/UL
NRBC BLD AUTO-RTO: 0 /100
PH UR STRIP: 5.5 PH (ref 5–7)
PLATELET # BLD AUTO: 206 10E9/L (ref 150–450)
POTASSIUM SERPL-SCNC: 4.2 MMOL/L (ref 3.4–5.3)
PROT SERPL-MCNC: 8.6 G/DL (ref 6.8–8.8)
RBC # BLD AUTO: 5.58 10E12/L (ref 4.4–5.9)
RBC #/AREA URNS AUTO: 0 /HPF (ref 0–2)
SODIUM SERPL-SCNC: 139 MMOL/L (ref 133–144)
SOURCE: ABNORMAL
SP GR UR STRIP: 1.02 (ref 1–1.03)
UROBILINOGEN UR STRIP-MCNC: NORMAL MG/DL (ref 0–2)
WBC # BLD AUTO: 7.2 10E9/L (ref 4–11)
WBC #/AREA URNS AUTO: 1 /HPF (ref 0–5)

## 2021-02-16 PROCEDURE — 74177 CT ABD & PELVIS W/CONTRAST: CPT | Mod: 26 | Performed by: RADIOLOGY

## 2021-02-16 PROCEDURE — 80053 COMPREHEN METABOLIC PANEL: CPT | Performed by: EMERGENCY MEDICINE

## 2021-02-16 PROCEDURE — 96361 HYDRATE IV INFUSION ADD-ON: CPT | Performed by: FAMILY MEDICINE

## 2021-02-16 PROCEDURE — 250N000011 HC RX IP 250 OP 636: Performed by: STUDENT IN AN ORGANIZED HEALTH CARE EDUCATION/TRAINING PROGRAM

## 2021-02-16 PROCEDURE — 96374 THER/PROPH/DIAG INJ IV PUSH: CPT | Mod: 59 | Performed by: FAMILY MEDICINE

## 2021-02-16 PROCEDURE — 81001 URINALYSIS AUTO W/SCOPE: CPT | Performed by: EMERGENCY MEDICINE

## 2021-02-16 PROCEDURE — 76870 US EXAM SCROTUM: CPT

## 2021-02-16 PROCEDURE — 99285 EMERGENCY DEPT VISIT HI MDM: CPT | Performed by: FAMILY MEDICINE

## 2021-02-16 PROCEDURE — 86140 C-REACTIVE PROTEIN: CPT | Performed by: EMERGENCY MEDICINE

## 2021-02-16 PROCEDURE — 258N000003 HC RX IP 258 OP 636: Performed by: FAMILY MEDICINE

## 2021-02-16 PROCEDURE — 76870 US EXAM SCROTUM: CPT | Mod: 26 | Performed by: RADIOLOGY

## 2021-02-16 PROCEDURE — 74177 CT ABD & PELVIS W/CONTRAST: CPT

## 2021-02-16 PROCEDURE — 85025 COMPLETE CBC W/AUTO DIFF WBC: CPT | Performed by: EMERGENCY MEDICINE

## 2021-02-16 PROCEDURE — 93976 VASCULAR STUDY: CPT | Mod: 26 | Performed by: RADIOLOGY

## 2021-02-16 PROCEDURE — 99285 EMERGENCY DEPT VISIT HI MDM: CPT | Mod: 25 | Performed by: FAMILY MEDICINE

## 2021-02-16 PROCEDURE — 250N000011 HC RX IP 250 OP 636: Performed by: FAMILY MEDICINE

## 2021-02-16 RX ORDER — KETOROLAC TROMETHAMINE 15 MG/ML
15 INJECTION, SOLUTION INTRAMUSCULAR; INTRAVENOUS ONCE
Status: COMPLETED | OUTPATIENT
Start: 2021-02-16 | End: 2021-02-16

## 2021-02-16 RX ORDER — IOPAMIDOL 755 MG/ML
111 INJECTION, SOLUTION INTRAVASCULAR ONCE
Status: COMPLETED | OUTPATIENT
Start: 2021-02-16 | End: 2021-02-16

## 2021-02-16 RX ADMIN — KETOROLAC TROMETHAMINE 15 MG: 15 INJECTION, SOLUTION INTRAMUSCULAR; INTRAVENOUS at 08:37

## 2021-02-16 RX ADMIN — IOPAMIDOL 111 ML: 755 INJECTION, SOLUTION INTRAVENOUS at 08:01

## 2021-02-16 RX ADMIN — SODIUM CHLORIDE 1000 ML: 9 INJECTION, SOLUTION INTRAVENOUS at 07:22

## 2021-02-16 ASSESSMENT — ENCOUNTER SYMPTOMS
DYSPHORIC MOOD: 0
CHILLS: 0
BLOOD IN STOOL: 0
APPETITE CHANGE: 0
DECREASED CONCENTRATION: 1
BRUISES/BLEEDS EASILY: 0
NAUSEA: 0
DYSURIA: 0
VOICE CHANGE: 0
ABDOMINAL PAIN: 1
CONSTIPATION: 0
ACTIVITY CHANGE: 1
FEVER: 0
WEAKNESS: 0
FLANK PAIN: 1
DIARRHEA: 0
VOMITING: 0
NERVOUS/ANXIOUS: 0
SHORTNESS OF BREATH: 0
AGITATION: 0
CONFUSION: 0
LIGHT-HEADEDNESS: 0
BACK PAIN: 1

## 2021-02-16 ASSESSMENT — MIFFLIN-ST. JEOR: SCORE: 1675.35

## 2021-02-16 NOTE — DISCHARGE INSTRUCTIONS
Home.  Labs and CT normal.  US with ? Epididymitis.  Treat symptoms and monitor.  Follow up with MD if ongoing symptoms.    Results for orders placed or performed during the hospital encounter of 02/16/21   CT Abdomen Pelvis w Contrast     Status: None    Narrative    EXAMINATION: CT ABDOMEN PELVIS W CONTRAST, 2/16/2021 8:09 AM    TECHNIQUE:  Helical CT images from the lung bases through the  symphysis pubis were obtained with IV contrast. Contrast dose:  iopamidol (ISOVUE-370) solution 111 mL    COMPARISON: No relevant prior imaging is available for comparison    HISTORY: LLQ pain and left flank pain eval for bowel vs renal    FINDINGS:  Timing of contrast bolus is slightly early with no filling of the  hepatic veins.    Abdomen and pelvis:     Homogenous hepatic parenchyma, without focal liver lesion. The portal  vein is patent. No calcified gallstones. No intra or extrahepatic  biliary dilatation. Unremarkable pancreas, spleen, and adrenal glands.  Symmetric nephrographic enhancement of the kidneys, without  hydronephrosis or nephrolithiasis. No ureteral dilatation. The urinary  bladder is distended and otherwise unremarkable. The prostate gland is  mildly prominent. Symmetric seminal vesicles.    The stomach is decompressed. No abnormally dilated loop of small or  large bowel. Normal appendix. No evidence of diverticulosis. No  intraperitoneal free fluid or free air. No pneumatosis or portal  venous gas. The abdominal aorta is patent, without evidence of  infrarenal abdominal aortic aneurysm. No pathologically enlarged lymph  nodes within the abdomen or pelvis.    Lung bases: The visualized lung bases are clear, without pleural  effusion. The heart is normal in size, without pericardial effusion.  Unremarkable distal esophagus.     Bones and soft tissues: No acute or aggressive osseous lesion. Mild  degenerative changes of the spine.      Impression    IMPRESSION: No specific findings within the abdomen or  pelvis to  explain the patient's symptoms of left lower quadrant pain. No  evidence of nephrolithiasis or colonic diverticula.     I have personally reviewed the examination and initial interpretation  and I agree with the findings.    JOHAN LEE MD   CBC with platelets differential     Status: None   Result Value Ref Range    WBC 7.2 4.0 - 11.0 10e9/L    RBC Count 5.58 4.4 - 5.9 10e12/L    Hemoglobin 17.5 13.3 - 17.7 g/dL    Hematocrit 52.5 40.0 - 53.0 %    MCV 94 78 - 100 fl    MCH 31.4 26.5 - 33.0 pg    MCHC 33.3 31.5 - 36.5 g/dL    RDW 11.9 10.0 - 15.0 %    Platelet Count 206 150 - 450 10e9/L    Diff Method Automated Method     % Neutrophils 52.3 %    % Lymphocytes 34.4 %    % Monocytes 9.9 %    % Eosinophils 2.7 %    % Basophils 0.6 %    % Immature Granulocytes 0.1 %    Nucleated RBCs 0 0 /100    Absolute Neutrophil 3.7 1.6 - 8.3 10e9/L    Absolute Lymphocytes 2.5 0.8 - 5.3 10e9/L    Absolute Monocytes 0.7 0.0 - 1.3 10e9/L    Absolute Eosinophils 0.2 0.0 - 0.7 10e9/L    Absolute Basophils 0.0 0.0 - 0.2 10e9/L    Abs Immature Granulocytes 0.0 0 - 0.4 10e9/L    Absolute Nucleated RBC 0.0    Comprehensive metabolic panel     Status: None   Result Value Ref Range    Sodium 139 133 - 144 mmol/L    Potassium 4.2 3.4 - 5.3 mmol/L    Chloride 107 94 - 109 mmol/L    Carbon Dioxide 28 20 - 32 mmol/L    Anion Gap 3 3 - 14 mmol/L    Glucose 98 70 - 99 mg/dL    Urea Nitrogen 26 7 - 30 mg/dL    Creatinine 0.99 0.66 - 1.25 mg/dL    GFR Estimate 82 >60 mL/min/[1.73_m2]    GFR Estimate If Black >90 >60 mL/min/[1.73_m2]    Calcium 9.4 8.5 - 10.1 mg/dL    Bilirubin Total 0.7 0.2 - 1.3 mg/dL    Albumin 4.6 3.4 - 5.0 g/dL    Protein Total 8.6 6.8 - 8.8 g/dL    Alkaline Phosphatase 103 40 - 150 U/L    ALT 30 0 - 70 U/L    AST 27 0 - 45 U/L   UA with Microscopic reflex to Culture     Status: Abnormal    Specimen: Urine Midstream; Midstream Urine   Result Value Ref Range    Color Urine Yellow     Appearance Urine Clear      Glucose Urine Negative NEG^Negative mg/dL    Bilirubin Urine Negative NEG^Negative    Ketones Urine Negative NEG^Negative mg/dL    Specific Gravity Urine 1.025 1.003 - 1.035    Blood Urine Negative NEG^Negative    pH Urine 5.5 5.0 - 7.0 pH    Protein Albumin Urine Negative NEG^Negative mg/dL    Urobilinogen mg/dL Normal 0.0 - 2.0 mg/dL    Nitrite Urine Negative NEG^Negative    Leukocyte Esterase Urine Negative NEG^Negative    Source Midstream Urine     WBC Urine 1 0 - 5 /HPF    RBC Urine 0 0 - 2 /HPF    Mucous Urine Present (A) NEG^Negative /LPF   CRP inflammation     Status: None   Result Value Ref Range    CRP Inflammation <2.9 0.0 - 8.0 mg/L

## 2021-02-16 NOTE — ED TRIAGE NOTES
Patient reports left lower abdominal and flank pain that radiates down to his groin. He denies any urinary complaints, n/v/d. Pain started yesterday evening

## 2021-02-16 NOTE — ED PROVIDER NOTES
ED Provider Note  [facility]      History     Chief Complaint   Patient presents with     Abdominal Pain     Flank Pain     HPI  Robin Olmedo is a 60 year old male who presents emergency room with lower and left-sided abdominal pain starting yesterday.  Patient had a urgent bowel movement yesterday that was normal otherwise no blood diarrhea etc.  This was approximately 10 AM.  After that patient developed some lower abdominal cramping persistent pain he worked all day.  Last evening took some ibuprofen.  Patient unable to sleep through the night describes pain lower abdominal area also in the left flank and also radiating to left testicle no dysuric symptoms no diarrhea or stool sense no nausea vomiting fevers or chills or rash or trauma.  Patient had an episode a week or so ago that resolved after a day or so unclear etiology no history of kidney stones no history of diverticular disease urinary tract infections etc.  Patient drove himself here does not want any narcotic for pain at this point.    Past Medical History  Past Medical History:   Diagnosis Date     Hypertension      SVT (supraventricular tachycardia) (H)      Past Surgical History:   Procedure Laterality Date     ARTHROSCOPY WRIST Left 6/17/2011    Procedure: ARTHROSCOPY, WRIST;  Surgeon: Tatiana Castro MD;  Location:  OR     OPEN REDUCTION INTERNAL FIXATION WRIST Left 6/17/2011    Procedure: OPEN REDUCTION INTERNAL FIXATION, FRACTURE, WRIST;  Surgeon: Tatiana Castro MD;  Location:  OR     ORTHOPEDIC SURGERY       REMOVE HARDWARE WRIST  1/12/2012    Procedure:REMOVE HARDWARE WRIST; Hardware Removal Left Wrist, Plate x 2, Screw  x 10 ; Surgeon:TATIANA CASTRO; Location: OR          diltiazem ER COATED BEADS (DILTIAZEM CD) 300 MG 24 hr capsule       lisinopril (ZESTRIL) 40 MG tablet       sildenafil (REVATIO) 20 MG tablet      No Known Allergies  Family History  Family History   Problem  "Relation Age of Onset     Arrhythmia Mother      Arrhythmia Father      Social History   Social History     Tobacco Use     Smoking status: Never Smoker     Smokeless tobacco: Never Used   Substance Use Topics     Alcohol use: Yes     Comment: 2 drinks daily     Drug use: No      Past medical history, past surgical history, medications, allergies, family history, and social history were reviewed with the patient. No additional pertinent items.       Review of Systems   Constitutional: Positive for activity change. Negative for appetite change, chills and fever.   HENT: Negative for voice change.    Eyes: Negative for visual disturbance.   Respiratory: Negative for shortness of breath.    Cardiovascular: Negative for chest pain.   Gastrointestinal: Positive for abdominal pain. Negative for blood in stool, constipation, diarrhea, nausea and vomiting.        Describing lower abdominal pain in the left flank   Genitourinary: Positive for flank pain and testicular pain. Negative for discharge, dysuria, penile pain and urgency.        Left flank rating to left testicle some tenderness noted.   Musculoskeletal: Positive for back pain.        Left flank   Skin: Negative for rash.   Allergic/Immunologic: Negative for immunocompromised state.   Neurological: Negative for syncope, weakness and light-headedness.   Hematological: Does not bruise/bleed easily.   Psychiatric/Behavioral: Positive for decreased concentration. Negative for agitation, confusion and dysphoric mood. The patient is not nervous/anxious.    All other systems reviewed and are negative.    A complete review of systems was performed with pertinent positives and negatives noted in the HPI, and all other systems negative.         Physical Exam   BP: (!) 161/100  Pulse: (!) 46  Temp: 97.8  F (36.6  C)  Resp: 20  Height: 185.4 cm (6' 1\")  Weight: 81.6 kg (180 lb)  SpO2: 100 %  Physical Exam  Vitals signs and nursing note reviewed.   Constitutional:       General: " He is in acute distress.      Appearance: He is well-developed. He is not ill-appearing, toxic-appearing or diaphoretic.      Comments: Slightly uncomfortable but is not wanting the pain able to lay flat.   HENT:      Head: Normocephalic and atraumatic.      Nose: Nose normal.      Mouth/Throat:      Mouth: Mucous membranes are moist.   Eyes:      General: No scleral icterus.     Extraocular Movements: Extraocular movements intact.      Conjunctiva/sclera: Conjunctivae normal.      Pupils: Pupils are equal, round, and reactive to light.   Neck:      Musculoskeletal: Normal range of motion and neck supple.   Cardiovascular:      Rate and Rhythm: Normal rate.   Pulmonary:      Effort: No respiratory distress.   Abdominal:      General: There is no distension.      Palpations: Abdomen is soft. There is no mass.      Tenderness: There is abdominal tenderness. There is no guarding or rebound.      Hernia: No hernia is present.      Comments: Abdomen is soft with some deep palpation left lower quadrant there is some slight tenderness no mass no rebound or guarding no pulsatile mass.   Genitourinary:     Testes:         Left: Tenderness present. Mass or swelling not present.   Musculoskeletal:         General: No swelling or tenderness.   Skin:     General: Skin is warm and dry.      Capillary Refill: Capillary refill takes less than 2 seconds.      Coloration: Skin is not jaundiced.      Findings: No rash.   Neurological:      General: No focal deficit present.      Mental Status: He is alert and oriented to person, place, and time. Mental status is at baseline.   Psychiatric:      Comments: Mildly flattened because of discomfort otherwise appropriate           ED Course         Patient valuated here in the ER.  Patient did have to wait for period time as the night physician was busy.  I did see the patient right away in the morning when I got here.  Explained to him regarding this also.  IV had been established and labs  drawn.  White count was 7 2 hemoglobin 17.5.  Urinalysis is negative for any blood or infection.  Chemistries otherwise reviewed as noted below.    Here in the ER patient is monitoring her pain initially.  We did hang a liter of normal saline bolus in the ER and ordered a CT scan with contrast of the abdomen for assessment this point.    As noted discussed with patient regarding findings CT scan of the abdomen with contrast did not show any acute findings at all.    Testicular ultrasound was done.  Findings reveal initially reported me as some epididymitis in the left then called back and stated appeared more consistent with testicular appendix torsion.  Discussed with urology also at this point symptomatic control patient does agree as of physicians noted will use nonsteroidal anti-inflammatories and monitor symptoms return if any concerns.    At this point wanted to be discharged.    Procedures                         Results for orders placed or performed during the hospital encounter of 02/16/21   CT Abdomen Pelvis w Contrast     Status: None    Narrative    EXAMINATION: CT ABDOMEN PELVIS W CONTRAST, 2/16/2021 8:09 AM    TECHNIQUE:  Helical CT images from the lung bases through the  symphysis pubis were obtained with IV contrast. Contrast dose:  iopamidol (ISOVUE-370) solution 111 mL    COMPARISON: No relevant prior imaging is available for comparison    HISTORY: LLQ pain and left flank pain eval for bowel vs renal    FINDINGS:  Timing of contrast bolus is slightly early with no filling of the  hepatic veins.    Abdomen and pelvis:     Homogenous hepatic parenchyma, without focal liver lesion. The portal  vein is patent. No calcified gallstones. No intra or extrahepatic  biliary dilatation. Unremarkable pancreas, spleen, and adrenal glands.  Symmetric nephrographic enhancement of the kidneys, without  hydronephrosis or nephrolithiasis. No ureteral dilatation. The urinary  bladder is distended and otherwise  unremarkable. The prostate gland is  mildly prominent. Symmetric seminal vesicles.    The stomach is decompressed. No abnormally dilated loop of small or  large bowel. Normal appendix. No evidence of diverticulosis. No  intraperitoneal free fluid or free air. No pneumatosis or portal  venous gas. The abdominal aorta is patent, without evidence of  infrarenal abdominal aortic aneurysm. No pathologically enlarged lymph  nodes within the abdomen or pelvis.    Lung bases: The visualized lung bases are clear, without pleural  effusion. The heart is normal in size, without pericardial effusion.  Unremarkable distal esophagus.     Bones and soft tissues: No acute or aggressive osseous lesion. Mild  degenerative changes of the spine.      Impression    IMPRESSION: No specific findings within the abdomen or pelvis to  explain the patient's symptoms of left lower quadrant pain. No  evidence of nephrolithiasis or colonic diverticula.     I have personally reviewed the examination and initial interpretation  and I agree with the findings.    JOHAN LEE MD   US Testicular & Scrotum w Doppler Ltd     Status: None    Narrative    EXAMINATION: US TESTICULAR AND SCROTAL, 2/16/2021 9:28 AM     COMPARISON: None.    HISTORY: Left testicle and flank pain    TECHNIQUE: The scrotum was scanned in standard fashion with  specialized ultrasound transducer(s) using both grey scale and limited  color Doppler techniques.    Findings:  The testes demonstrate normal and symmetric echotexture and  vascularity. .  The right testicle measures 4.6 x 2.9 x 3.2 cm and the  left measures 4.1 x 3.2 x 3.1 cm. There is no evidence of torsion.    Bilateral mild hydroceles. No evidence of varicoceles.    The right epididymis is within normal limits and measures 2.0 x 1.2 x  1.0 cm. The left epididymis measures 1.4 x 0.8 x 1.9 centimeters.There  is an isoechoic well-circumscribed nodular lesion without internal  vasculature and with surrounding  hyperemia measuring 0.8 x 0.8 x 0.8  cm which likely represents an testicular appendage torsion.      Impression    Impression:   1. No evidence of testicular torsion.  2. Small 0.8 cm isoechoic well-circumscribed lesion between the left  testicle and epididymis, which likely represents an testicular  appendage torsion. Of note, this is where patient is experiencing  pain. Recommend follow up ultrasound in 2-3 months to confirm  resolution/calcification.  3. Bilateral mild hydroceles.      Findings were discussed with Dr. Todd, by Dr. Juan MD at 2/16/2021  10:20 AM and verbalized understanding of the finding.     I have personally reviewed the examination and initial interpretation  and I agree with the findings.    ALTHEA CAMPBELL MD   CBC with platelets differential     Status: None   Result Value Ref Range    WBC 7.2 4.0 - 11.0 10e9/L    RBC Count 5.58 4.4 - 5.9 10e12/L    Hemoglobin 17.5 13.3 - 17.7 g/dL    Hematocrit 52.5 40.0 - 53.0 %    MCV 94 78 - 100 fl    MCH 31.4 26.5 - 33.0 pg    MCHC 33.3 31.5 - 36.5 g/dL    RDW 11.9 10.0 - 15.0 %    Platelet Count 206 150 - 450 10e9/L    Diff Method Automated Method     % Neutrophils 52.3 %    % Lymphocytes 34.4 %    % Monocytes 9.9 %    % Eosinophils 2.7 %    % Basophils 0.6 %    % Immature Granulocytes 0.1 %    Nucleated RBCs 0 0 /100    Absolute Neutrophil 3.7 1.6 - 8.3 10e9/L    Absolute Lymphocytes 2.5 0.8 - 5.3 10e9/L    Absolute Monocytes 0.7 0.0 - 1.3 10e9/L    Absolute Eosinophils 0.2 0.0 - 0.7 10e9/L    Absolute Basophils 0.0 0.0 - 0.2 10e9/L    Abs Immature Granulocytes 0.0 0 - 0.4 10e9/L    Absolute Nucleated RBC 0.0    Comprehensive metabolic panel     Status: None   Result Value Ref Range    Sodium 139 133 - 144 mmol/L    Potassium 4.2 3.4 - 5.3 mmol/L    Chloride 107 94 - 109 mmol/L    Carbon Dioxide 28 20 - 32 mmol/L    Anion Gap 3 3 - 14 mmol/L    Glucose 98 70 - 99 mg/dL    Urea Nitrogen 26 7 - 30 mg/dL    Creatinine 0.99 0.66 - 1.25 mg/dL    GFR  Estimate 82 >60 mL/min/[1.73_m2]    GFR Estimate If Black >90 >60 mL/min/[1.73_m2]    Calcium 9.4 8.5 - 10.1 mg/dL    Bilirubin Total 0.7 0.2 - 1.3 mg/dL    Albumin 4.6 3.4 - 5.0 g/dL    Protein Total 8.6 6.8 - 8.8 g/dL    Alkaline Phosphatase 103 40 - 150 U/L    ALT 30 0 - 70 U/L    AST 27 0 - 45 U/L   UA with Microscopic reflex to Culture     Status: Abnormal    Specimen: Urine Midstream; Midstream Urine   Result Value Ref Range    Color Urine Yellow     Appearance Urine Clear     Glucose Urine Negative NEG^Negative mg/dL    Bilirubin Urine Negative NEG^Negative    Ketones Urine Negative NEG^Negative mg/dL    Specific Gravity Urine 1.025 1.003 - 1.035    Blood Urine Negative NEG^Negative    pH Urine 5.5 5.0 - 7.0 pH    Protein Albumin Urine Negative NEG^Negative mg/dL    Urobilinogen mg/dL Normal 0.0 - 2.0 mg/dL    Nitrite Urine Negative NEG^Negative    Leukocyte Esterase Urine Negative NEG^Negative    Source Midstream Urine     WBC Urine 1 0 - 5 /HPF    RBC Urine 0 0 - 2 /HPF    Mucous Urine Present (A) NEG^Negative /LPF   CRP inflammation     Status: None   Result Value Ref Range    CRP Inflammation <2.9 0.0 - 8.0 mg/L     Medications   0.9% sodium chloride BOLUS (0 mLs Intravenous Stopped 2/16/21 0910)   iopamidol (ISOVUE-370) solution 111 mL (111 mLs Intravenous Given 2/16/21 0801)   sodium chloride (PF) 0.9% PF flush 82 mL (82 mLs Intravenous Given 2/16/21 0801)   ketorolac (TORADOL) injection 15 mg (15 mg Intravenous Given 2/16/21 0837)        Assessments & Plan (with Medical Decision Making)  60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  No history of kidney stones or UTI symptoms no fevers or chills no history diverticular disease etc.  Valuate in the ER all labs CBC chemistries urinalysis all negative.  CT scan of the abdomen was done which was negative for any stone diverticular disease  thickening free fluid etc testicular ultrasound revealed some questionable findings of testicular appendix torsion.  No other major torsion or other abnormalities noted.  Patient here in the ER had received Toradol IV with improvement of symptoms.  Patient is comfortable going home will monitor symptoms managed with nonsteroidal anti-inflammatories and return if worsening symptoms with follow-up MD discussed with urology and agree at this point.         I have reviewed the nursing notes. I have reviewed the findings, diagnosis, plan and need for follow up with the patient.    Discharge Medication List as of 2/16/2021 10:15 AM          Final diagnoses:   LLQ abdominal pain   Left flank pain   Torsion of appendix of testis       --  Micheal Todd    [facility] EMERGENCY DEPARTMENT  2/16/2021    This note was created at least in part by the use of dragon voice dictation system. Inadvertent typographical errors may still exist.  Michael Todd MD.    Patient evaluated in the emergency department during the COVID-19 pandemic period. Careful attention to patients safety was addressed throughout the evaluation. Evaluation and treatment management was initiated with disposition made efficiently and appropriate as possible to minimize any risk of potential exposure to patient during this evaluation.       Michael Todd MD  02/16/21 1936

## 2021-03-01 ENCOUNTER — TELEPHONE (OUTPATIENT)
Dept: CARDIOLOGY | Facility: CLINIC | Age: 61
End: 2021-03-01

## 2021-03-01 DIAGNOSIS — I77.810 DILATED AORTIC ROOT (H): Primary | ICD-10-CM

## 2021-03-01 NOTE — TELEPHONE ENCOUNTER
[facility] Call Center    Phone Message    May a detailed message be left on voicemail: no     Reason for Call: Other: I spoke with the Imaging Department. They called to request a 2 onth extension of referral ID# [id number]. Also, the technician said the order was not signed by Dr Mendosa. Please reach out to Imaging with any questions or concerns at [phone].      Action Taken: Message routed to:  [facility] ([facility]): Cardiology    Travel Screening: Not Applicable

## 2021-03-01 NOTE — CONFIDENTIAL NOTE
Spoke to the imaging department. Current order can't be scheduled from, unsure of reason why. Order also is  and needs to be extended.     New order placed for MRA. Awaiting MD co-sign. Will then update patient via MyC that he can call scheduling to schedule the testing after MD signs.    NIK Calvillo, RN, PHN  Electrophysiology Nurse Coordinator    Addendum: Order signed. Sent [employer] message to patient he can call to schedule.    NIK Calvillo, RN, PHN  Electrophysiology Nurse Coordinator

## 2021-05-24 DIAGNOSIS — I10 HYPERTENSION GOAL BP (BLOOD PRESSURE) < 140/90: ICD-10-CM

## 2021-05-24 RX ORDER — LISINOPRIL 40 MG/1
40 TABLET ORAL DAILY
Qty: 30 TABLET | Refills: 0 | Status: SHIPPED | OUTPATIENT
Start: 2021-05-24 | End: 2021-06-21

## 2021-05-24 RX ORDER — DILTIAZEM HYDROCHLORIDE 300 MG/1
300 CAPSULE, COATED, EXTENDED RELEASE ORAL DAILY
Qty: 30 CAPSULE | Refills: 0 | Status: SHIPPED | OUTPATIENT
Start: 2021-05-24 | End: 2021-06-21

## 2021-05-24 NOTE — TELEPHONE ENCOUNTER
Duplicate.  See Mychart encounter    Process of scheduling appointment.  Last seen 5/28/2019.    Tracy Patten RN

## 2021-05-24 NOTE — TELEPHONE ENCOUNTER
Scheduled patient for his physical 6/21 at 7:00 am.    Medication is being filled for 1 time refill only due to:  Patient needs to be seen because it has been more than one year since last visit.     Tracy Patten RN

## 2021-06-16 NOTE — PATIENT INSTRUCTIONS
"ASSESSMENT AND PLAN  1. Routine general medical examination at a health care facility      2. Hypertension goal BP (blood pressure) < 140/90    - Albumin Random Urine Quantitative with Creat Ratio  - Comprehensive metabolic panel  - diltiazem ER COATED BEADS (DILTIAZEM CD) 300 MG 24 hr capsule; Take 1 capsule (300 mg) by mouth daily  Dispense: 90 capsule; Refill: 3  - lisinopril (ZESTRIL) 40 MG tablet; Take 1 tablet (40 mg) by mouth daily  Dispense: 90 tablet; Refill: 3    3. Erectile dysfunction, unspecified erectile dysfunction type    - sildenafil (REVATIO) 20 MG tablet; One to three tablets daily as needed.  Dispense: 20 tablet; Refill: 11    4. CARDIOVASCULAR SCREENING; LDL GOAL LESS THAN 130    - Lipid panel reflex to direct LDL Fasting    5. Encounter for hepatitis C screening test for low risk patient    - Hepatitis C Screen Reflex to HCV RNA Quant and Genotype    6. Screening for HIV (human immunodeficiency virus)    - HIV Antigen Antibody Combo        MYCHART SIGN UP: [email or web address] , 1-218.411.1401    E-VISITS CAN BE DONE FOR CARE/PRESCRIPTIONS WHICH MAY NOT NEED AN IN-PERSON ASSESSMENT - click \"on-line care, then request e-visit\".      ONCARE VISIT/PRESCRIPTIONS: [email or web address]  - we treat nearly 50 common conditions with one hour response time     RADIOLOGY SCHEDULING  [facility]:  [phone]   [facility]: [phone]  [facility]: [phone]    Mammogram and Colonoscopy [phone]    Smoking Cessation: www.quitplan.org, 1-270-878-PLAN (8177)    [facility] Scheduling:  [phone].    CONSUMER PRICE LINE for estimates of test costs:  [phone]       Preventive Health Recommendations  Male Ages 50 - 64    Yearly exam:             See your health care provider every year in order to  o   Review health changes.   o   Discuss preventive care.    o   Review your medicines if your doctor has prescribed any.     Have a cholesterol test " every 5 years, or more frequently if you are at risk for high cholesterol/heart disease.     Have a diabetes test (fasting glucose) every three years. If you are at risk for diabetes, you should have this test more often.     Have a colonoscopy at age 50, or have a yearly FIT test (stool test). These exams will check for colon cancer.      Talk with your health care provider about whether or not a prostate cancer screening test (PSA) is right for you.    You should be tested each year for STDs (sexually transmitted diseases), if you re at risk.     Shots: Get a flu shot each year. Get a tetanus shot every 10 years.     Nutrition:    Eat at least 5 servings of fruits and vegetables daily.     Eat whole-grain bread, whole-wheat pasta and brown rice instead of white grains and rice.     Get adequate Calcium and Vitamin D.     Lifestyle    Exercise for at least 150 minutes a week (30 minutes a day, 5 days a week). This will help you control your weight and prevent disease.     Limit alcohol to one drink per day.     No smoking.     Wear sunscreen to prevent skin cancer.     See your dentist every six months for an exam and cleaning.     See your eye doctor every 1 to 2 years.

## 2021-06-16 NOTE — PROGRESS NOTES
SUBJECTIVE:   CC: Robin Olmedo is an 60 year old male who presents for preventative health visit.     Patient has been advised of split billing requirements and indicates understanding: Yes  Healthy Habits:     Getting at least 3 servings of Calcium per day:  Yes    Bi-annual eye exam:  NO    Dental care twice a year:  NO    Sleep apnea or symptoms of sleep apnea:  None    Diet:  Regular (no restrictions)    Frequency of exercise:  6-7 days/week    Duration of exercise:  45-60 minutes    Taking medications regularly:  Yes    Medication side effects:  None    PHQ-2 Total Score: 0    Additional concerns today:  No          PROBLEMS TO ADD ON...  -------------------------------------  Overall well.  Very difficult year but improving.  High stress level but no depression.     Did have cardiology consultation a year ago.  Due for aortic root mri.     Home bps 120s/70-80s mostly.     Continues to bike, eat healthy.       Today's PHQ-2 Score:   PHQ-2 ( 1999 Pf[employer]) 6/14/2021   Q1: Little interest or pleasure in doing things 0   Q2: Feeling down, depressed or hopeless 0   PHQ-2 Score 0   Q1: Little interest or pleasure in doing things Not at all   Q2: Feeling down, depressed or hopeless Not at all   PHQ-2 Score 0       Abuse: Current or Past(Physical, Sexual or Emotional)- No  Do you feel safe in your environment? Yes    Have you ever done Advance Care Planning? (For example, a Health Directive, POLST, or a discussion with a medical provider or your loved ones about your wishes): No, advance care planning information given to patient to review.  Patient declined advance care planning discussion at this time.    Social History     Tobacco Use     Smoking status: Never Smoker     Smokeless tobacco: Never Used   Substance Use Topics     Alcohol use: Yes     Comment: 2 drinks daily     If you drink alcohol do you typically have >3 drinks per day or >7 drinks per week? Yes      Alcohol Use 6/14/2021   Prescreen: >3 drinks/day  or >7 drinks/week? Yes   Prescreen: >3 drinks/day or >7 drinks/week? -   AUDIT SCORE  6     AUDIT - Alcohol Use Disorders Identification Test - Reproduced from the World Health Organization Audit 2001 (Second Edition) 6/14/2021   1.  How often do you have a drink containing alcohol? 4 or more times a week   2.  How many drinks containing alcohol do you have on a typical day when you are drinking? 1 or 2   3.  How often do you have five or more drinks on one occasion? Less than monthly   4.  How often during the last year have you found that you were not able to stop drinking once you had started? Never   5.  How often during the last year have you failed to do what was normally expected of you because of drinking? Never   6.  How often during the last year have you needed a first drink in the morning to get yourself going after a heavy drinking session? Never   7.  How often during the last year have you had a feeling of guilt or remorse after drinking? Less than monthly   8.  How often during the last year have you been unable to remember what happened the night before because of your drinking? Never   9.  Have you or someone else been injured because of your drinking? No   10. Has a relative, friend, doctor or other health care worker been concerned about your drinking or suggested you cut down? No   TOTAL SCORE 6       Last PSA: No results found for: PSA    Reviewed orders with patient. Reviewed health maintenance and updated orders accordingly - Yes      Reviewed and updated as needed this visit by clinical staff                 Reviewed and updated as needed this visit by Provider                Past Medical History:   Diagnosis Date     Hypertension      SVT (supraventricular tachycardia) (H)       Past Surgical History:   Procedure Laterality Date     ARTHROSCOPY WRIST Left 6/17/2011    Procedure: ARTHROSCOPY, WRIST;  Surgeon: Lizzy Cervantes MD;  [facility]     COLONOSCOPY  2009    nl     OPEN  REDUCTION INTERNAL FIXATION WRIST Left 6/17/2011    Procedure: OPEN REDUCTION INTERNAL FIXATION, FRACTURE, WRIST;  Surgeon: Tatiana Castro MD;  [facility]     ORTHOPEDIC SURGERY       REMOVE HARDWARE WRIST  1/12/2012    Procedure:REMOVE HARDWARE WRIST; Hardware Removal Left Wrist, Plate x 2, Screw  x 10 ; Surgeon:TATIANA CASTRO; [facility]       Review of Systems  CONSTITUTIONAL: NEGATIVE for fever, chills, change in weight  INTEGUMENTARY/SKIN: NEGATIVE for worrisome rashes, moles or lesions  EYES: NEGATIVE for vision changes or irritation  ENT: NEGATIVE for ear, mouth and throat problems  RESP: NEGATIVE for significant cough or SOB  CV: NEGATIVE for chest pain, palpitations or peripheral edema  GI: NEGATIVE for nausea, abdominal pain, heartburn, or change in bowel habits   male: negative for dysuria, hematuria, decreased urinary stream, erectile dysfunction, urethral discharge  MUSCULOSKELETAL: NEGATIVE for significant arthralgias or myalgia  NEURO: NEGATIVE for weakness, dizziness or paresthesias  PSYCHIATRIC: NEGATIVE for changes in mood or affect    OBJECTIVE:   There were no vitals taken for this visit.    Physical Exam  GENERAL: healthy, alert and no distress  EYES: Eyes grossly normal to inspection, PERRL and conjunctivae and sclerae normal  HENT: ear canals and TM's normal, nose and mouth without ulcers or lesions  NECK: no adenopathy, no asymmetry, masses, or scars and thyroid normal to palpation  RESP: lungs clear to auscultation - no rales, rhonchi or wheezes  CV: regular rate and rhythm, normal S1 S2, no S3 or S4, no murmur, click or rub, no peripheral edema and peripheral pulses strong  ABDOMEN: soft, nontender, no hepatosplenomegaly, no masses and bowel sounds normal   (male): normal male genitalia without lesions or urethral discharge, no hernia  MS: no gross musculoskeletal defects noted, no edema  Rectal:  Declined  SKIN: no suspicious lesions or rashes  NEURO:  "Normal strength and tone, mentation intact and speech normal  PSYCH: mentation appears normal, affect normal/bright        ASSESSMENT/PLAN:   1. Routine general medical examination at a health care facility  Immunizations utd except shingrix plans to get age 64.     Due for colonoscopy, scheduling number given.     Repeat blood pressue excellent.     Refilled meds, labs as below.     He plans to schedule aortic root MRI still.     2. Hypertension goal BP (blood pressure) < 140/90    - Albumin Random Urine Quantitative with Creat Ratio  - Comprehensive metabolic panel  - diltiazem ER COATED BEADS (DILTIAZEM CD) 300 MG 24 hr capsule; Take 1 capsule (300 mg) by mouth daily  Dispense: 90 capsule; Refill: 3  - lisinopril (ZESTRIL) 40 MG tablet; Take 1 tablet (40 mg) by mouth daily  Dispense: 90 tablet; Refill: 3    3. Erectile dysfunction, unspecified erectile dysfunction type    - sildenafil (REVATIO) 20 MG tablet; One to three tablets daily as needed.  Dispense: 20 tablet; Refill: 11    4. CARDIOVASCULAR SCREENING; LDL GOAL LESS THAN 130    - Lipid panel reflex to direct LDL Fasting    5. Encounter for hepatitis C screening test for low risk patient    - Hepatitis C Screen Reflex to HCV RNA Quant and Genotype    6. Screening for HIV (human immunodeficiency virus)    - HIV Antigen Antibody Combo    7. Dilated aortic root (H)      8. Supraventricular tachycardia (H)  /pac:  Continue diltiazem. Had zio patch x two without a-fib.       Patient has been advised of split billing requirements and indicates understanding: Yes  COUNSELING:   Reviewed preventive health counseling, as reflected in patient instructions       Regular exercise       Healthy diet/nutrition       Colon cancer screening       Prostate cancer screening    Estimated body mass index is 23.75 kg/m  as calculated from the following:    Height as of 2/16/21: 1.854 m (6' 1\").    Weight as of 2/16/21: 81.6 kg (180 lb).         He reports that he has never " smoked. He has never used smokeless tobacco.      Counseling Resources:  ATP IV Guidelines  Pooled Cohorts Equation Calculator  FRAX Risk Assessment  ICSI Preventive Guidelines  Dietary Guidelines for Americans, 2010  USDA's MyPlate  ASA Prophylaxis  Lung CA Screening    Joel Daniel Wegener, MD  [facility]

## 2021-06-21 ENCOUNTER — OFFICE VISIT (OUTPATIENT)
Dept: FAMILY MEDICINE | Facility: CLINIC | Age: 61
End: 2021-06-21
Payer: COMMERCIAL

## 2021-06-21 VITALS
HEART RATE: 54 BPM | HEIGHT: 73 IN | TEMPERATURE: 98.5 F | BODY MASS INDEX: 24.92 KG/M2 | DIASTOLIC BLOOD PRESSURE: 80 MMHG | OXYGEN SATURATION: 100 % | SYSTOLIC BLOOD PRESSURE: 112 MMHG | RESPIRATION RATE: 16 BRPM | WEIGHT: 188 LBS

## 2021-06-21 DIAGNOSIS — I47.10 SUPRAVENTRICULAR TACHYCARDIA (H): ICD-10-CM

## 2021-06-21 DIAGNOSIS — N52.9 ERECTILE DYSFUNCTION, UNSPECIFIED ERECTILE DYSFUNCTION TYPE: ICD-10-CM

## 2021-06-21 DIAGNOSIS — Z11.59 ENCOUNTER FOR HEPATITIS C SCREENING TEST FOR LOW RISK PATIENT: ICD-10-CM

## 2021-06-21 DIAGNOSIS — Z13.6 CARDIOVASCULAR SCREENING; LDL GOAL LESS THAN 130: ICD-10-CM

## 2021-06-21 DIAGNOSIS — I10 HYPERTENSION GOAL BP (BLOOD PRESSURE) < 140/90: ICD-10-CM

## 2021-06-21 DIAGNOSIS — Z00.00 ROUTINE GENERAL MEDICAL EXAMINATION AT A HEALTH CARE FACILITY: Primary | ICD-10-CM

## 2021-06-21 DIAGNOSIS — Z11.4 SCREENING FOR HIV (HUMAN IMMUNODEFICIENCY VIRUS): ICD-10-CM

## 2021-06-21 DIAGNOSIS — I77.810 DILATED AORTIC ROOT (H): ICD-10-CM

## 2021-06-21 LAB
ALBUMIN SERPL-MCNC: 3.7 G/DL (ref 3.4–5)
ALP SERPL-CCNC: 80 U/L (ref 40–150)
ALT SERPL W P-5'-P-CCNC: 28 U/L (ref 0–70)
ANION GAP SERPL CALCULATED.3IONS-SCNC: 1 MMOL/L (ref 3–14)
AST SERPL W P-5'-P-CCNC: 26 U/L (ref 0–45)
BILIRUB SERPL-MCNC: 0.5 MG/DL (ref 0.2–1.3)
BUN SERPL-MCNC: 15 MG/DL (ref 7–30)
CALCIUM SERPL-MCNC: 8.6 MG/DL (ref 8.5–10.1)
CHLORIDE SERPL-SCNC: 107 MMOL/L (ref 94–109)
CHOLEST SERPL-MCNC: 237 MG/DL
CO2 SERPL-SCNC: 29 MMOL/L (ref 20–32)
CREAT SERPL-MCNC: 0.99 MG/DL (ref 0.66–1.25)
CREAT UR-MCNC: 175 MG/DL
GFR SERPL CREATININE-BSD FRML MDRD: 82 ML/MIN/{1.73_M2}
GLUCOSE SERPL-MCNC: 97 MG/DL (ref 70–99)
HCV AB SERPL QL IA: NONREACTIVE
HDLC SERPL-MCNC: 94 MG/DL
HIV 1+2 AB+HIV1 P24 AG SERPL QL IA: NONREACTIVE
LDLC SERPL CALC-MCNC: 127 MG/DL
MICROALBUMIN UR-MCNC: 11 MG/L
MICROALBUMIN/CREAT UR: 6.4 MG/G CR (ref 0–17)
NONHDLC SERPL-MCNC: 143 MG/DL
POTASSIUM SERPL-SCNC: 3.8 MMOL/L (ref 3.4–5.3)
PROT SERPL-MCNC: 7.1 G/DL (ref 6.8–8.8)
SODIUM SERPL-SCNC: 137 MMOL/L (ref 133–144)
TRIGL SERPL-MCNC: 82 MG/DL

## 2021-06-21 PROCEDURE — 36415 COLL VENOUS BLD VENIPUNCTURE: CPT | Performed by: FAMILY MEDICINE

## 2021-06-21 PROCEDURE — 87389 HIV-1 AG W/HIV-1&-2 AB AG IA: CPT | Performed by: FAMILY MEDICINE

## 2021-06-21 PROCEDURE — 82043 UR ALBUMIN QUANTITATIVE: CPT | Performed by: FAMILY MEDICINE

## 2021-06-21 PROCEDURE — 86803 HEPATITIS C AB TEST: CPT | Performed by: FAMILY MEDICINE

## 2021-06-21 PROCEDURE — 99396 PREV VISIT EST AGE 40-64: CPT | Performed by: FAMILY MEDICINE

## 2021-06-21 PROCEDURE — 80061 LIPID PANEL: CPT | Performed by: FAMILY MEDICINE

## 2021-06-21 PROCEDURE — 80053 COMPREHEN METABOLIC PANEL: CPT | Performed by: FAMILY MEDICINE

## 2021-06-21 RX ORDER — DILTIAZEM HYDROCHLORIDE 300 MG/1
300 CAPSULE, COATED, EXTENDED RELEASE ORAL DAILY
Qty: 90 CAPSULE | Refills: 3 | Status: SHIPPED | OUTPATIENT
Start: 2021-06-21 | End: 2022-06-09

## 2021-06-21 RX ORDER — SILDENAFIL CITRATE 20 MG/1
TABLET ORAL
Qty: 20 TABLET | Refills: 11 | Status: SHIPPED | OUTPATIENT
Start: 2021-06-21 | End: 2023-07-12

## 2021-06-21 RX ORDER — LISINOPRIL 40 MG/1
40 TABLET ORAL DAILY
Qty: 90 TABLET | Refills: 3 | Status: SHIPPED | OUTPATIENT
Start: 2021-06-21 | End: 2022-06-09

## 2021-06-21 ASSESSMENT — MIFFLIN-ST. JEOR: SCORE: 1711.64

## 2021-07-19 ENCOUNTER — HOSPITAL ENCOUNTER (OUTPATIENT)
Facility: AMBULATORY SURGERY CENTER | Age: 61
End: 2021-07-19
Attending: STUDENT IN AN ORGANIZED HEALTH CARE EDUCATION/TRAINING PROGRAM
Payer: COMMERCIAL

## 2021-07-19 ENCOUNTER — TELEPHONE (OUTPATIENT)
Dept: GASTROENTEROLOGY | Facility: OUTPATIENT CENTER | Age: 61
End: 2021-07-19

## 2021-07-19 DIAGNOSIS — Z11.59 ENCOUNTER FOR SCREENING FOR OTHER VIRAL DISEASES: ICD-10-CM

## 2021-07-19 NOTE — TELEPHONE ENCOUNTER
"Screening Questions  1. Are you active on mychart?Y    2. What insurance is in the chart?     2.  Ordering/Referring Provider:     3. BMI : 6'1\"/185=24.4    4. Are you on daily home oxygen? n    5. Do you have a history of difficult airway? n    6. Have you had a heart, lung, or liver transplant? n    7. Are you currently on dialysis? n    8. Have you had a stroke or Transient ischemic atttack (TIA) within 6 months? n    9. In the past 6 months, have you had any heart related issues including cardiomyopathy or heart attack?         If yes, did it require cardiac stenting or other implantable device?N    10. Do you have any implantable devices in your body (pacemaker, defib, LVAD)? N    11. Do you take nitroglycerin? If yes, how often? N    12. Are you currently taking any blood thinners?N    13. Are you a diabetic? N    14. (Females) Are you currently pregnant? N/A  If yes, how many weeks?    15. Have you had a procedure in the past that was difficult to tolerate with conscious sedation? Any allergies to Fentanyl or Versed N    16. Are you taking any scheduled prescription narcotics more than once daily? N    17. Do you have any chemical dependencies such as alcohol, street drugs, or methadone? N    18. Do you have any history of post-traumatic stress syndrome or mental health issues? N    19. Do you transfer independently? Y    20.  Do you have any issues with constipation? : n    21. Preferred Pharmacy for Pre Prescription [id number] [address] AT [facility] [place] [address]    Scheduling Details    Colonoscopy Prep Sent?: 7/19  Procedure Scheduled: COLON-CS   Provider/Surgeon: keesha  Date of Procedure: 9/2  Location: [facility]  Caller (Please ask for phone number if not scheduled by patient): PATIENT      Sedation Type: CS  Conscious Sedation- Needs  for 6 hours after the procedure  MAC/General-Needs  for 24 hours after procedure    Pre-op Required at [facility], " [place], [place] and OR for MAC sedation:   (if yes advise patient they will need a pre-op prior to procedure)      Is patient on blood thinners? -N (If yes- inform patient to follow up with PCP or provider for follow up instructions)     Informed patient they will need an adult  Y  Cannot take any type of public or medical transportation alone    Informed Patient of COVID Test Requirement Y-    Confirmed Nurse will call to complete assessment Y    Additional comments:

## 2021-07-27 ENCOUNTER — HOSPITAL ENCOUNTER (OUTPATIENT)
Facility: AMBULATORY SURGERY CENTER | Age: 61
End: 2021-07-27
Attending: INTERNAL MEDICINE
Payer: COMMERCIAL

## 2021-07-27 ENCOUNTER — TELEPHONE (OUTPATIENT)
Dept: GASTROENTEROLOGY | Facility: OUTPATIENT CENTER | Age: 61
End: 2021-07-27

## 2021-07-27 DIAGNOSIS — Z11.59 ENCOUNTER FOR SCREENING FOR OTHER VIRAL DISEASES: ICD-10-CM

## 2021-07-27 NOTE — TELEPHONE ENCOUNTER
Caller: Robin Olmedo    Procedure: colonoscopy    Date of Procedure Cancelled: 09/02/21    Ordering Provider:Joel Wegener    Reason for cancel: per patient     Rescheduled: yes     If rescheduled:    Date: 09/20/21   Location: [facility] [place] [place]   Note any change or update to original order/sedation: prep the same, changed doctor to Benny, updated covid test to align with new procedure date.

## 2021-09-13 ENCOUNTER — TELEPHONE (OUTPATIENT)
Dept: GASTROENTEROLOGY | Facility: CLINIC | Age: 61
End: 2021-09-13

## 2021-09-13 NOTE — TELEPHONE ENCOUNTER
Attempted to contact patient regarding upcoming colonoscopy procedure on 9/20/21 for pre assessment questions. No answer.     Left message to return call to [phone] #2    Covid test scheduled: 9/17/21    Arrival time: 0705    Facility location: [facility]    Sedation type: CS    Bowel prep recommendation: Miralax/Magnesium citrate/Dulcolax       Margo Rogers RN

## 2021-09-14 ENCOUNTER — TELEPHONE (OUTPATIENT)
Dept: GASTROENTEROLOGY | Facility: CLINIC | Age: 61
End: 2021-09-14

## 2021-09-14 NOTE — TELEPHONE ENCOUNTER
Covid test scheduled: 9-17 [facility]    Arrival time: 0705    Facility location: [facility]    Sedation type: conscious    Bowel prep recommendation: Iva Palmer RN    Patient declined review. policy, conscious sedation plan reviewed. Instructed patient to have someone stay with them for 6 hours post exam.

## 2021-09-15 ENCOUNTER — TELEPHONE (OUTPATIENT)
Dept: GASTROENTEROLOGY | Facility: CLINIC | Age: 61
End: 2021-09-15

## 2021-09-15 NOTE — TELEPHONE ENCOUNTER
Caller: [facility] called to cancel procedure    Procedure: Colon    Date of Procedure Cancelled: 9/20    Ordering Provider:Wegener, Joel Daniel Irwin, MD    Reason for cancel: Schedule conflict came up    Any Staff messages sent regarding case?: NO                  Recipient and Sender:  & *                  Message Details:       Rescheduled: NO, Patient will call back to reschedule     If rescheduled:    Date:    Location:    Note any change or update to original order/sedation:

## 2021-10-24 ENCOUNTER — HEALTH MAINTENANCE LETTER (OUTPATIENT)
Age: 61
End: 2021-10-24

## 2022-02-07 ENCOUNTER — TELEPHONE (OUTPATIENT)
Dept: GASTROENTEROLOGY | Facility: CLINIC | Age: 62
End: 2022-02-07
Payer: COMMERCIAL

## 2022-02-07 ENCOUNTER — MYC MEDICAL ADVICE (OUTPATIENT)
Dept: CARDIOLOGY | Facility: CLINIC | Age: 62
End: 2022-02-07
Payer: COMMERCIAL

## 2022-02-07 DIAGNOSIS — I77.810 DILATED AORTIC ROOT (H): Primary | ICD-10-CM

## 2022-02-07 NOTE — TELEPHONE ENCOUNTER
Caller: Robin Olmedo    Procedure: colonoscopy    Date, Location, and Surgeon of Procedure Cancelled: 02/24/2022 at [facility] [place] with Dr. Pierre    Ordering Provider:Wegener, Joel Daniel Irwin, MD in [facility]    Reason for cancel (please be detailed, any staff messages or encounters to note?): scheduling conflict        Rescheduled: yes     If rescheduled:    Date: 03/11/2022   Location: [facility]   Note any change or update to original order/sedation: NO

## 2022-02-08 NOTE — TELEPHONE ENCOUNTER
Previous order from  . Dr. Navya dillard with reordering for pt to follow up at this time. Will notify pt via [facility].    Moisés Ferreira, RN   Cardiology Nurse Coordinator

## 2022-02-12 DIAGNOSIS — Z11.59 ENCOUNTER FOR SCREENING FOR OTHER VIRAL DISEASES: Primary | ICD-10-CM

## 2022-03-07 ENCOUNTER — TELEPHONE (OUTPATIENT)
Dept: GASTROENTEROLOGY | Facility: CLINIC | Age: 62
End: 2022-03-07

## 2022-03-07 NOTE — TELEPHONE ENCOUNTER
Pre assessment questions completed for upcoming colonoscopy procedure scheduled on 3/11/22    COVID test scheduled? 3/8/22 - [facility]. Patient will fax results. Patient is aware that it needs to be a PCR test.     Reviewed procedural arrival time 0855 and facility location [facility].    Designated  policy reviewed. Instructed to have someone stay 6 hours post procedure.     Reviewed Miralax prep instructions with patient. No fiber/iron supplements or foods that contain nuts/seeds 7 days prior to procedure.     Patient verbalized understanding and had no questions or concerns at this time.    Margo Rogers RN

## 2022-03-07 NOTE — TELEPHONE ENCOUNTER
Patient scheduled for colonoscopy on 3/11/22.     Covid test scheduled?    Arrival time: 0855    Facility location: [facility]    Sedation type: CS    Indication for procedure: screening    Anticoagulations? no     Bowel prep recommendation: Miralax/Magnesium citrate/Dulcolax     Pre visit planning completed.    Margo Rogers RN

## 2022-03-11 ENCOUNTER — HOSPITAL ENCOUNTER (OUTPATIENT)
Facility: AMBULATORY SURGERY CENTER | Age: 62
Discharge: HOME | End: 2022-03-11
Attending: INTERNAL MEDICINE | Admitting: INTERNAL MEDICINE
Payer: COMMERCIAL

## 2022-03-11 VITALS
OXYGEN SATURATION: 100 % | WEIGHT: 185 LBS | HEART RATE: 55 BPM | BODY MASS INDEX: 24.52 KG/M2 | HEIGHT: 73 IN | TEMPERATURE: 97.4 F | DIASTOLIC BLOOD PRESSURE: 94 MMHG | RESPIRATION RATE: 16 BRPM | SYSTOLIC BLOOD PRESSURE: 131 MMHG

## 2022-03-11 LAB — COLONOSCOPY: NORMAL

## 2022-03-11 PROCEDURE — 88360 TUMOR IMMUNOHISTOCHEM/MANUAL: CPT | Mod: 26 | Performed by: PATHOLOGY

## 2022-03-11 PROCEDURE — 88305 TISSUE EXAM BY PATHOLOGIST: CPT | Mod: TC | Performed by: INTERNAL MEDICINE

## 2022-03-11 PROCEDURE — 88305 TISSUE EXAM BY PATHOLOGIST: CPT | Mod: 26 | Performed by: PATHOLOGY

## 2022-03-11 PROCEDURE — 45385 COLONOSCOPY W/LESION REMOVAL: CPT | Mod: 33

## 2022-03-11 PROCEDURE — 88341 IMHCHEM/IMCYTCHM EA ADD ANTB: CPT | Mod: 26 | Performed by: PATHOLOGY

## 2022-03-11 PROCEDURE — 88342 IMHCHEM/IMCYTCHM 1ST ANTB: CPT | Mod: 26 | Performed by: PATHOLOGY

## 2022-03-11 RX ORDER — ONDANSETRON 4 MG/1
4 TABLET, ORALLY DISINTEGRATING ORAL EVERY 6 HOURS PRN
Status: DISCONTINUED | OUTPATIENT
Start: 2022-03-11 | End: 2022-03-12 | Stop reason: HOSPADM

## 2022-03-11 RX ORDER — NALOXONE HYDROCHLORIDE 0.4 MG/ML
0.2 INJECTION, SOLUTION INTRAMUSCULAR; INTRAVENOUS; SUBCUTANEOUS
Status: DISCONTINUED | OUTPATIENT
Start: 2022-03-11 | End: 2022-03-12 | Stop reason: HOSPADM

## 2022-03-11 RX ORDER — LIDOCAINE 40 MG/G
CREAM TOPICAL
Status: DISCONTINUED | OUTPATIENT
Start: 2022-03-11 | End: 2022-03-11 | Stop reason: HOSPADM

## 2022-03-11 RX ORDER — FLUMAZENIL 0.1 MG/ML
0.2 INJECTION, SOLUTION INTRAVENOUS
Status: ACTIVE | OUTPATIENT
Start: 2022-03-11 | End: 2022-03-11

## 2022-03-11 RX ORDER — ONDANSETRON 2 MG/ML
4 INJECTION INTRAMUSCULAR; INTRAVENOUS EVERY 6 HOURS PRN
Status: DISCONTINUED | OUTPATIENT
Start: 2022-03-11 | End: 2022-03-12 | Stop reason: HOSPADM

## 2022-03-11 RX ORDER — NALOXONE HYDROCHLORIDE 0.4 MG/ML
0.4 INJECTION, SOLUTION INTRAMUSCULAR; INTRAVENOUS; SUBCUTANEOUS
Status: DISCONTINUED | OUTPATIENT
Start: 2022-03-11 | End: 2022-03-12 | Stop reason: HOSPADM

## 2022-03-11 RX ORDER — FENTANYL CITRATE 50 UG/ML
INJECTION, SOLUTION INTRAMUSCULAR; INTRAVENOUS PRN
Status: DISCONTINUED | OUTPATIENT
Start: 2022-03-11 | End: 2022-03-11 | Stop reason: HOSPADM

## 2022-03-11 RX ORDER — PROCHLORPERAZINE MALEATE 10 MG
10 TABLET ORAL EVERY 6 HOURS PRN
Status: DISCONTINUED | OUTPATIENT
Start: 2022-03-11 | End: 2022-03-12 | Stop reason: HOSPADM

## 2022-03-11 RX ORDER — SIMETHICONE
LIQUID (ML) MISCELLANEOUS PRN
Status: DISCONTINUED | OUTPATIENT
Start: 2022-03-11 | End: 2022-03-11 | Stop reason: HOSPADM

## 2022-03-11 RX ORDER — ONDANSETRON 2 MG/ML
4 INJECTION INTRAMUSCULAR; INTRAVENOUS
Status: DISCONTINUED | OUTPATIENT
Start: 2022-03-11 | End: 2022-03-11 | Stop reason: HOSPADM

## 2022-03-11 NOTE — DISCHARGE INSTRUCTIONS
Discharge Instructions after Colonoscopy    Today you had a ____ Colonoscopy ____     Activity and Diet  You were given medicine for pain. You may be dizzy or sleepy.  For 24 hours:    Do not drive or use heavy equipment.    Do not make important decisions.    Do not drink any alcohol.  You may return to your normal diet and medicines.    Discomfort    Air was placed in your colon during the exam in order to see it. Walking helps to pass the air.    You may take Tylenol (acetaminophen) for pain unless your doctor has told you not to.  Do not take aspirin or ibuprofen (Advil, Motrin, or other anti-inflammatory  drugs) for _____ days.    Follow-up  ____ We took small tissue samples or polyps to study. Your doctor will call you with the results  within two weeks.    When to call:    Call right away if you have:    Unusual pain in belly or chest pain not relieved with passing air.    More than 1 to 2 Tablespoons of bleeding from your rectum.    Fever above 100.6  F (37.5  C).    If you have severe pain, bleeding, or shortness of breath, go to an emergency room.    If you have questions, call:  Monday to Friday, 8 a.m. to 4:30 p.m.  Central Scheduling Department: [phone]    After hours  [facility]: [phone] (Ask for the GI fellow on call)

## 2022-03-11 NOTE — H&P
Robin Olmedo  [id number]  male  61 year old      Reason for procedure/surgery: screening    Patient Active Problem List   Diagnosis     Pneumonia     SVT (supraventricular tachycardia) (H)     CARDIOVASCULAR SCREENING; LDL GOAL LESS THAN 130     Hypertension goal BP (blood pressure) < 140/90     Heartburn     Transient acantholytic dermatosis (hong)     Paroxysmal atrial fibrillation (H)     Dilated aortic root (H)       Past Surgical History:    Past Surgical History:   Procedure Laterality Date     ARTHROSCOPY WRIST Left 6/17/2011    Procedure: ARTHROSCOPY, WRIST;  Surgeon: Tatiana Castro MD;  [facility]     COLONOSCOPY  2009    nl     OPEN REDUCTION INTERNAL FIXATION WRIST Left 6/17/2011    Procedure: OPEN REDUCTION INTERNAL FIXATION, FRACTURE, WRIST;  Surgeon: Tatiana Castro MD;  [facility]     ORTHOPEDIC SURGERY       REMOVE HARDWARE WRIST  1/12/2012    Procedure:REMOVE HARDWARE WRIST; Hardware Removal Left Wrist, Plate x 2, Screw  x 10 ; Surgeon:TATIANA CASTRO; [facility]       Past Medical History:   Past Medical History:   Diagnosis Date     Hypertension      SVT (supraventricular tachycardia) (H)        Social History:   Social History     Tobacco Use     Smoking status: Never Smoker     Smokeless tobacco: Former User   Substance Use Topics     Alcohol use: Yes     Comment: 2 drinks daily       Family History:   Family History   Problem Relation Age of Onset     Arrhythmia Mother      Arrhythmia Father      Hypertension Brother        Allergies: No Known Allergies    Active Medications:   Current Outpatient Medications   Medication Sig Dispense Refill     diltiazem ER COATED BEADS (DILTIAZEM CD) 300 MG 24 hr capsule Take 1 capsule (300 mg) by mouth daily 90 capsule 3     lisinopril (ZESTRIL) 40 MG tablet Take 1 tablet (40 mg) by mouth daily 90 tablet 3     sildenafil (REVATIO) 20 MG tablet One to three tablets daily as needed. 20 tablet 11       Systemic  "Review:   CONSTITUTIONAL: NEGATIVE for fever, chills, change in weight  ENT/MOUTH: NEGATIVE for ear, mouth and throat problems  RESP: NEGATIVE for significant cough or SOB  CV: NEGATIVE for chest pain, palpitations or peripheral edema    Physical Examination:   Vital Signs: BP (!) 147/97   Pulse 55   Temp (!) 96.6  F (35.9  C) (Temporal)   Resp 16   Ht 1.854 m (6' 1\")   Wt 83.9 kg (185 lb)   SpO2 99%   BMI 24.41 kg/m    GENERAL: healthy, alert and no distress  NECK: no adenopathy, no asymmetry, masses, or scars  RESP: lungs clear to auscultation - no rales, rhonchi or wheezes  CV: regular rate and rhythm, normal S1 S2, no S3 or S4, no murmur, click or rub, no peripheral edema and peripheral pulses strong  ABDOMEN: soft, nontender, no hepatosplenomegaly, no masses and bowel sounds normal  MS: no gross musculoskeletal defects noted, no edema    Plan: Appropriate to proceed as scheduled.      Champ Sutton MD  3/11/2022    PCP:  Wegener, Joel Daniel Irwin    "

## 2022-03-14 LAB
PATH REPORT.COMMENTS IMP SPEC: NORMAL
PATH REPORT.FINAL DX SPEC: NORMAL
PATH REPORT.GROSS SPEC: NORMAL
PATH REPORT.MICROSCOPIC SPEC OTHER STN: NORMAL
PATH REPORT.RELEVANT HX SPEC: NORMAL
PHOTO IMAGE: NORMAL

## 2022-03-22 ENCOUNTER — TELEPHONE (OUTPATIENT)
Dept: GASTROENTEROLOGY | Facility: CLINIC | Age: 62
End: 2022-03-22
Payer: COMMERCIAL

## 2022-03-22 DIAGNOSIS — D3A.8 BENIGN NEUROENDOCRINE TUMOR OF RECTUM (H): Primary | ICD-10-CM

## 2022-03-22 PROBLEM — R12 HEARTBURN: Status: RESOLVED | Noted: 2017-02-23 | Resolved: 2022-03-22

## 2022-03-22 NOTE — TELEPHONE ENCOUNTER
"Pathology from recent colonoscopy reviewed.    Small SSA removed from hepatic flexure.  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    Discussed neuroendocrine tumor with pathology- unable to comment on margins on polypectomy specimen.  Would consider margins as \"indeterminate.\"    Discussed with advanced endoscopy- recommend follow-up flexible sigmoidoscopy in 3 months with deep resection of polyp (if present) or multiple biopsies if no polyp.      If no evidence of neuroendocrine tumor on biopsy specimens, no additional follow-up recommended.    Will set up flexible sigmoidoscopy in 3 months.  No sedation needed.  No ride home needed.    Discussed with patient.    Champ Sutton MD  GI/Hepatology    "

## 2022-03-22 NOTE — TELEPHONE ENCOUNTER
"Caller: To N[facility]    Procedure: Flex Sig    Date, Location, and Surgeon of Procedure Cancelled: NA    Ordering Provider: Champ Llamas MD     Reason for cancel (please be detailed, any staff messages or encounters to note?): See staff message below:    From:Champ Llamas MD    To: P Endoscopy Scheduling Pool  Date/Time: 3/22 12:51 PM  Subject: set up flex sig in 3 months     \"Hi     Can we set this patient up for flex sig in 3 months with me please?  Order is in.  No sedation needed.  Patient is aware.     Patient is in [place] on vacation at the moment.  Can you call him to schedule on 3/28/22 please?     Thanks     Daljit\"    "

## 2022-03-29 ENCOUNTER — TELEPHONE (OUTPATIENT)
Dept: GASTROENTEROLOGY | Facility: CLINIC | Age: 62
End: 2022-03-29
Payer: COMMERCIAL

## 2022-03-29 NOTE — TELEPHONE ENCOUNTER
Scheduling Details      Caller : Robin Olmedo  (Please ask for phone number if not scheduled by patient)    Type of Procedure Scheduled: Flex Sig  Which Colonoscopy Prep was Sent?:   [facility] PATIENTS & [facility] PATIENTS NEEDS EXTENDED PREP  Surgeon: Herman  Date of Procedure: 6/17/2022  [place] [facility] [place]edation Type: NO SEDATION  Conscious Sedation- Needs  for 6 hours after the procedure  MAC/General-Needs  for 24 hours after procedure    Pre-op Required at [facility], [place], [place] and OR for MAC sedation: no  (advise patient they will need a pre-op prior to procedure -)      Informed patient they will need an adult  not needed  Cannot take any type of public or medical transportation alone    Pre-Procedure Covid test to be completed at [facility] or Externally: yes at [facility]     Confirmed Nurse will call to complete assessment no     Additional comments: no

## 2022-04-07 ENCOUNTER — HOSPITAL ENCOUNTER (OUTPATIENT)
Dept: MRI IMAGING | Facility: CLINIC | Age: 62
Discharge: HOME | End: 2022-04-07
Attending: INTERNAL MEDICINE | Admitting: INTERNAL MEDICINE
Payer: COMMERCIAL

## 2022-04-07 DIAGNOSIS — I77.810 DILATED AORTIC ROOT (H): ICD-10-CM

## 2022-04-07 PROCEDURE — A9585 GADOBUTROL INJECTION: HCPCS | Performed by: INTERNAL MEDICINE

## 2022-04-07 PROCEDURE — 255N000002 HC RX 255 OP 636: Performed by: INTERNAL MEDICINE

## 2022-04-07 PROCEDURE — 71555 MRI ANGIO CHEST W OR W/O DYE: CPT | Mod: 26 | Performed by: RADIOLOGY

## 2022-04-07 PROCEDURE — 71555 MRI ANGIO CHEST W OR W/O DYE: CPT

## 2022-04-07 RX ORDER — GADOBUTROL 604.72 MG/ML
10 INJECTION INTRAVENOUS ONCE
Status: COMPLETED | OUTPATIENT
Start: 2022-04-07 | End: 2022-04-07

## 2022-04-07 RX ADMIN — GADOBUTROL 10 ML: 604.72 INJECTION INTRAVENOUS at 08:08

## 2022-04-11 ENCOUNTER — MYC MEDICAL ADVICE (OUTPATIENT)
Dept: FAMILY MEDICINE | Facility: CLINIC | Age: 62
End: 2022-04-11
Payer: COMMERCIAL

## 2022-04-11 DIAGNOSIS — R91.8 PULMONARY NODULES: Primary | ICD-10-CM

## 2022-04-12 ENCOUNTER — HOSPITAL ENCOUNTER (OUTPATIENT)
Dept: CT IMAGING | Facility: CLINIC | Age: 62
Discharge: HOME | End: 2022-04-12
Attending: INTERNAL MEDICINE | Admitting: INTERNAL MEDICINE
Payer: COMMERCIAL

## 2022-04-12 PROCEDURE — 71260 CT THORAX DX C+: CPT | Mod: 26 | Performed by: RADIOLOGY

## 2022-04-12 PROCEDURE — 71260 CT THORAX DX C+: CPT

## 2022-04-12 PROCEDURE — 250N000011 HC RX IP 250 OP 636: Performed by: STUDENT IN AN ORGANIZED HEALTH CARE EDUCATION/TRAINING PROGRAM

## 2022-04-12 RX ORDER — IOPAMIDOL 755 MG/ML
91 INJECTION, SOLUTION INTRAVASCULAR ONCE
Status: COMPLETED | OUTPATIENT
Start: 2022-04-12 | End: 2022-04-12

## 2022-04-12 RX ADMIN — IOPAMIDOL 91 ML: 755 INJECTION, SOLUTION INTRAVENOUS at 10:18

## 2022-04-25 ENCOUNTER — MYC MEDICAL ADVICE (OUTPATIENT)
Dept: FAMILY MEDICINE | Facility: CLINIC | Age: 62
End: 2022-04-25
Payer: COMMERCIAL

## 2022-04-25 DIAGNOSIS — R91.8 PULMONARY NODULES: Primary | ICD-10-CM

## 2022-04-28 ENCOUNTER — IMMUNIZATION (OUTPATIENT)
Dept: FAMILY MEDICINE | Facility: CLINIC | Age: 62
End: 2022-04-28
Payer: COMMERCIAL

## 2022-04-28 PROCEDURE — 0054A COVID-19,PF,PFIZER (12+ YRS): CPT

## 2022-04-28 PROCEDURE — 99207 PR NO CHARGE LOS: CPT

## 2022-04-28 PROCEDURE — 91305 COVID-19,PF,PFIZER (12+ YRS): CPT

## 2022-05-31 ENCOUNTER — OFFICE VISIT (OUTPATIENT)
Dept: PHARMACY | Facility: CLINIC | Age: 62
End: 2022-05-31
Payer: COMMERCIAL

## 2022-05-31 ENCOUNTER — VIRTUAL VISIT (OUTPATIENT)
Dept: FAMILY MEDICINE | Facility: CLINIC | Age: 62
End: 2022-05-31
Payer: COMMERCIAL

## 2022-05-31 DIAGNOSIS — I10 ESSENTIAL HYPERTENSION: ICD-10-CM

## 2022-05-31 DIAGNOSIS — U07.1 INFECTION DUE TO 2019 NOVEL CORONAVIRUS: Primary | ICD-10-CM

## 2022-05-31 DIAGNOSIS — U07.1 COVID-19: Primary | ICD-10-CM

## 2022-05-31 DIAGNOSIS — I77.810 DILATED AORTIC ROOT (H): ICD-10-CM

## 2022-05-31 DIAGNOSIS — I47.10 PAROXYSMAL SUPRAVENTRICULAR TACHYCARDIA (H): ICD-10-CM

## 2022-05-31 PROCEDURE — 99215 OFFICE O/P EST HI 40 MIN: CPT | Mod: 95 | Performed by: STUDENT IN AN ORGANIZED HEALTH CARE EDUCATION/TRAINING PROGRAM

## 2022-05-31 PROCEDURE — 99207 PR NO CHARGE LOS: CPT | Performed by: PHARMACIST

## 2022-05-31 ASSESSMENT — ENCOUNTER SYMPTOMS
SHORTNESS OF BREATH: 0
CHILLS: 1
COUGH: 1
FEVER: 1
DIARRHEA: 0
HEADACHES: 1

## 2022-05-31 NOTE — PATIENT INSTRUCTIONS

## 2022-05-31 NOTE — PROGRESS NOTES
Nba is a 61 year old who is being evaluated via a billable video visit.      How would you like to obtain your AVS? MyChart  If the video visit is dropped, the invitation should be resent by: cell  Will anyone else be joining your video visit? No    Video Start Time: 11:23 AM    Assessment & Plan     Nba was seen today for covid concern.    Diagnoses and all orders for this visit:    Infection due to 2019 novel coronavirus  -     nirmatrelvir and ritonavir (PAXLOVID) therapy pack; Take 3 tablets by mouth 2 times daily for 5 days Take 2 Nirmatrelvir tablets and 1 Ritonavir tablet twice daily for 5 days.  Pt tested positive this AM. Symptoms onset aprox 24 hours ago. Is interested in treatment with paxlovid. GFR normal. Met with PharmD today and discussed drug interactions. Will plan to reduce diltiazem dose over next few days with paxlovid. 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 Discussed common adverse events of bad taste, diarrhea and htn. Pt accepts possible risks. Discussed not using sildenafil now or 3 days after completion of paxlovid.       Essential hypertension  -     diltiazem ER COATED BEADS (CARDIAZEM LA) 180 MG 24 hr tablet; Take 1 tablet (180 mg) by mouth daily for 8 days  Paroxysmal supraventricular tachycardia (H)  Dilated aortic root (H)  Diagnoses above affect pt's COVID decompensation score. Will need to adjust chronic medicaiton while on COVID treatment. Will Go to 180mg dilt for 8 days then return to typical dosing. Discussed with PharmD and reviewed med interactions.     Prescription drug management  45 minutes spent on the date of the encounter doing chart review, history and exam, documentation and further activities per the note         Return if symptoms worsen or fail to improve.    Juaquin Santos DO  [facility]    Subjective   Nba is a 61 year old who presents for the following health issues     HPI      COVID-19 Symptom Review  How many days ago did these symptoms start? 1 day    Are any of the following symptoms significant for you?    New or worsening difficulty breathing? No SpO2 97-98    Worsening cough? Yes, it's a dry cough.     Fever or chills? Yes, I felt feverish or had chills. Last night     Headache: YES    Sore throat: YES and improved     Chest pain: no    Diarrhea: no    Body aches? YES    What treatments has patient tried? Tylenol, 0700 ibuprofen   Does patient live in a nursing home, group home, or shelter? no  Does patient have a way to get food/medications during quarantined? Yes, I have a friend or family member who can help me.    Home test antigen positive this AM.    MASSBP Score 5/31/2022   Age Greater than or equal to 65 years 0   BMI greater than or equal to 35 kg/m2 0   Has Diabetes Mellitus 0   Has Chronic Kidney Disease 0   Has Cardiovascular Disease and 55 years or older 2   Has Chronic Respiratory Disease and 55 years or older 0   Has Hypertension and 55 years or older 1   Is Immunocompromised 0   Is Pregnant 0   Member of BIPOC community (Black/, /, ,  or , or  or [place] Native)  0   MASSBP Score 3         Review of Systems   Constitutional: Positive for chills and fever.   HENT: Positive for congestion.    Respiratory: Positive for cough. Negative for shortness of breath.    Cardiovascular: Negative for chest pain.   Gastrointestinal: Negative for diarrhea.   Neurological: Positive for headaches. Negative for syncope.          Objective    Vitals - Patient Reported  SpO2 (Patient Reported): 98    Vitals:  No vitals were obtained today due to virtual visit.    Physical Exam   GENERAL: alert and no distress  EYES: Eyes grossly normal to inspection.   RESP: No audible wheeze or visible cyanosis.  No visible retractions or increased work of breathing.  Cough intermittent.  SKIN: Visible skin clear. No  significant rash, abnormal pigmentation or lesions.  NEURO: Cranial nerves grossly intact.  Mentation and speech appropriate for age.  PSYCH: Mentation appears normal, judgement and insight intact, normal speech          Video-Visit Details  Type of service:  Video Visit  Video End Time:11:32 AM  Originating Location (pt. Location): Home  Distant Location (provider location):  [facility]   Platform used for Video Visit: [employer]

## 2022-06-03 ENCOUNTER — TELEPHONE (OUTPATIENT)
Dept: GASTROENTEROLOGY | Facility: CLINIC | Age: 62
End: 2022-06-03
Payer: COMMERCIAL

## 2022-06-03 NOTE — TELEPHONE ENCOUNTER
Patient scheduled for flex sigmoidoscopy on 6/17/22.     Positive Covid 5/31/22 - symptomatic. Will send staff message to [facility] RN to ensure following facility Covid positive policy.     Arrival time: 0645    Facility location: [facility]     Sedation type: none    Indication for procedure: follow-up neuroendocrine tumor    Anticoagulations? no     Bowel prep recommendation: fleet enemas    Pre visit planning completed.    Margo Rogers RN

## 2022-06-07 NOTE — PROGRESS NOTES
Clinical Pharmacy Consult:                                                    Robin Olmedo is a 61 year old male called for a clinical pharmacist consult.  He was referred to me from Dr. Santos.     Reason for Consult: Paxlovid Drug interaction discussion     Discussion:   Medication list reviewed.   Discussed potential drug interaction between Paxlovid and Diltiazem.  CCB is currently used for tachicardia and blood pressure control. Has been a long term therapy.    Paxlovid has the potential to increase the Diltiazem dose, but the evidence and direction on this drug interaction is not clear.  This is not a full contraindication, but instead described as a caution.  Recommend today to reduce the Dilt dose to 180 mg once a day for 8 days while on Paxlovid, if this is the therapy considered for the patient.  - to be further discussed with Dr. Santos.      Plan:  1. Recommend to reduce diltiazem to 180 mg once daily x 8 days if Paxlovid is started.   2. I have described how to use the product.  I have explained related adverse effects of the drug therapy to the patient today.    Recommendations provided to Dr. Santos today in clinic.        René Amos, Pharm.D.

## 2022-06-08 NOTE — TELEPHONE ENCOUNTER
Attempt # 2    Attempted to contact patient regarding upcoming Flex Sig procedure on 6/17/22 for pre assessment questions.  No answer.  Left message to return call to [phone] #2    Antonia Browning RN

## 2022-06-14 NOTE — TELEPHONE ENCOUNTER
Pre assessment questions completed for upcoming flexible sigmoidoscopy procedure scheduled on 6.17.2022    COVID positive 5.31.2022.  Message below relayed to patient.    Reviewed procedural arrival time 0645 and facility location [facility].    No sedation    Anticoagulation/blood thinners? no    Electronic implanted devices? no    Reviewed flexible sigmoidoscopy prep instructions with patient.     Patient verbalized understanding and had no questions or concerns at this time.    Leidy Kennedy RN

## 2022-06-17 ENCOUNTER — HOSPITAL ENCOUNTER (OUTPATIENT)
Facility: AMBULATORY SURGERY CENTER | Age: 62
Discharge: HOME | End: 2022-06-17
Attending: INTERNAL MEDICINE | Admitting: INTERNAL MEDICINE
Payer: COMMERCIAL

## 2022-06-17 VITALS
SYSTOLIC BLOOD PRESSURE: 124 MMHG | HEART RATE: 47 BPM | WEIGHT: 190 LBS | DIASTOLIC BLOOD PRESSURE: 82 MMHG | RESPIRATION RATE: 16 BRPM | TEMPERATURE: 96.8 F | BODY MASS INDEX: 25.18 KG/M2 | HEIGHT: 73 IN | OXYGEN SATURATION: 99 %

## 2022-06-17 LAB — FLEXIBLE SIGMOIDOSCOPY: NORMAL

## 2022-06-17 PROCEDURE — 88305 TISSUE EXAM BY PATHOLOGIST: CPT | Mod: TC | Performed by: INTERNAL MEDICINE

## 2022-06-17 PROCEDURE — 88305 TISSUE EXAM BY PATHOLOGIST: CPT | Mod: 26 | Performed by: PATHOLOGY

## 2022-06-17 PROCEDURE — 45338 SIGMOIDOSCOPY W/TUMR REMOVE: CPT

## 2022-06-17 RX ORDER — PROCHLORPERAZINE MALEATE 10 MG
10 TABLET ORAL EVERY 6 HOURS PRN
Status: DISCONTINUED | OUTPATIENT
Start: 2022-06-17 | End: 2022-06-18 | Stop reason: HOSPADM

## 2022-06-17 RX ORDER — ONDANSETRON 2 MG/ML
4 INJECTION INTRAMUSCULAR; INTRAVENOUS EVERY 6 HOURS PRN
Status: DISCONTINUED | OUTPATIENT
Start: 2022-06-17 | End: 2022-06-18 | Stop reason: HOSPADM

## 2022-06-17 RX ORDER — NALOXONE HYDROCHLORIDE 0.4 MG/ML
0.4 INJECTION, SOLUTION INTRAMUSCULAR; INTRAVENOUS; SUBCUTANEOUS
Status: DISCONTINUED | OUTPATIENT
Start: 2022-06-17 | End: 2022-06-18 | Stop reason: HOSPADM

## 2022-06-17 RX ORDER — LIDOCAINE 40 MG/G
CREAM TOPICAL
Status: DISCONTINUED | OUTPATIENT
Start: 2022-06-17 | End: 2022-06-17 | Stop reason: HOSPADM

## 2022-06-17 RX ORDER — ONDANSETRON 2 MG/ML
4 INJECTION INTRAMUSCULAR; INTRAVENOUS
Status: DISCONTINUED | OUTPATIENT
Start: 2022-06-17 | End: 2022-06-17 | Stop reason: HOSPADM

## 2022-06-17 RX ORDER — NALOXONE HYDROCHLORIDE 0.4 MG/ML
0.2 INJECTION, SOLUTION INTRAMUSCULAR; INTRAVENOUS; SUBCUTANEOUS
Status: DISCONTINUED | OUTPATIENT
Start: 2022-06-17 | End: 2022-06-18 | Stop reason: HOSPADM

## 2022-06-17 RX ORDER — FLUMAZENIL 0.1 MG/ML
0.2 INJECTION, SOLUTION INTRAVENOUS
Status: ACTIVE | OUTPATIENT
Start: 2022-06-17 | End: 2022-06-17

## 2022-06-17 RX ORDER — ONDANSETRON 4 MG/1
4 TABLET, ORALLY DISINTEGRATING ORAL EVERY 6 HOURS PRN
Status: DISCONTINUED | OUTPATIENT
Start: 2022-06-17 | End: 2022-06-18 | Stop reason: HOSPADM

## 2022-06-17 NOTE — DISCHARGE INSTRUCTIONS
Discharge Instructions after Colonoscopy or Sigmoidoscopy       Today you had a ____ Colonoscopy ____ Sigmoidoscopy       Activity and Diet   You were given medicine for pain. You may be dizzy or sleepy.   For 24 hours:    Do not drive or use heavy equipment.    Do not make important decisions.    Do not drink any alcohol.   You may return to your normal diet and medicines.       Discomfort    Air was placed in your colon during the exam in order to see it. Walking helps to pass the air.    You may take Tylenol (acetaminophen) for pain unless your doctor has told you not to.   Do not take aspirin or ibuprofen (Advil, Motrin, or other anti-inflammatory   drugs) for _____ days.       Follow-up   ____ We took small tissue samples or polyps to study. Your doctor will call you with the results within two weeks.       When to call:       Call right away if you have:    Unusual pain in belly or chest pain not relieved with passing air.    More than 1 to 2 Tablespoons of bleeding from your rectum.    Fever above 100.6  F (37.5  C).       If you have severe pain, bleeding, or shortness of breath, go to an emergency room.       If you have questions, call:   Monday to Friday, 7 a.m. to 4:30 p.m.   Endoscopy: [phone] (We may have to call you back)       After hours   Hospital: [phone] (Ask for the GI fellow on call)

## 2022-06-17 NOTE — LETTER
June 23, 2022      Nba Olmedo  [address] [address]        Dear Nba,    The pathology results returned from the specimen taken at your recent colonoscopy.    There was no evidence of residual neuroendocrine tumor.  No further testing or intervention is required.      Based on the microscopic appearance of the polyp removed at your colonoscopy in March, current guidelines recommend that you undergo a follow-up colonoscopy in 5-10 years.    Sincerely,               Champ Sutton MD   [facility], [place], ENDOSCOPY  [address] [place] [address]  Phone: [phone]

## 2022-06-17 NOTE — H&P
Robin Olmedo  [id number]  male  61 year old      Reason for procedure/surgery: f/up polyp    Patient Active Problem List   Diagnosis     Paroxysmal supraventricular tachycardia (H)     CARDIOVASCULAR SCREENING; LDL GOAL LESS THAN 130     Essential hypertension     Transient acantholytic dermatosis (hong)     Dilated aortic root (H)       Past Surgical History:    Past Surgical History:   Procedure Laterality Date     ARTHROSCOPY WRIST Left 6/17/2011    Procedure: ARTHROSCOPY, WRIST;  Surgeon: Tatiana Castro MD;  [facility]     COLONOSCOPY  2009    nl     COLONOSCOPY N/A 3/11/2022    Procedure: COLONOSCOPY, WITH POLYPECTOMY;  Surgeon: Champ Llamas MD;  Location: [facility] OR     OPEN REDUCTION INTERNAL FIXATION WRIST Left 6/17/2011    Procedure: OPEN REDUCTION INTERNAL FIXATION, FRACTURE, WRIST;  Surgeon: Tatiana Castro MD;  Location:  OR     ORTHOPEDIC SURGERY       REMOVE HARDWARE WRIST  1/12/2012    Procedure:REMOVE HARDWARE WRIST; Hardware Removal Left Wrist, Plate x 2, Screw  x 10 ; Surgeon:TATIANA CASTRO; [facility]       Past Medical History:   Past Medical History:   Diagnosis Date     Hypertension      SVT (supraventricular tachycardia) (H)        Social History:   Social History     Tobacco Use     Smoking status: Never Smoker     Smokeless tobacco: Former User     Quit date: 1/1/2000   Substance Use Topics     Alcohol use: Yes     Comment: 2 drinks daily       Family History:   Family History   Problem Relation Age of Onset     Arrhythmia Mother      Arrhythmia Father      Hypertension Brother        Allergies: No Known Allergies    Active Medications:   Current Outpatient Medications   Medication Sig Dispense Refill     diltiazem ER COATED BEADS (CARDIZEM CD/CARTIA XT) 300 MG 24 hr capsule TAKE 1 CAPSULE(300 MG) BY MOUTH DAILY 30 capsule 0     lisinopril (ZESTRIL) 40 MG tablet TAKE 1 TABLET(40 MG) BY MOUTH DAILY 30 tablet 0     diltiazem ER COATED BEADS  "(HYACINTH ZUNIGA) 180 MG 24 hr tablet Take 1 tablet (180 mg) by mouth daily for 8 days 8 tablet 0     sildenafil (REVATIO) 20 MG tablet One to three tablets daily as needed. 20 tablet 11       Systemic Review:   CONSTITUTIONAL: NEGATIVE for fever, chills, change in weight  ENT/MOUTH: NEGATIVE for ear, mouth and throat problems  RESP: NEGATIVE for significant cough or SOB  CV: NEGATIVE for chest pain, palpitations or peripheral edema    Physical Examination:   Vital Signs: /82   Pulse (!) 47   Temp 96.8  F (36  C)   Resp 16   Ht 1.854 m (6' 1\")   Wt 86.2 kg (190 lb)   SpO2 99%   BMI 25.07 kg/m    GENERAL: healthy, alert and no distress  NECK: no adenopathy, no asymmetry, masses, or scars  RESP: lungs clear to auscultation - no rales, rhonchi or wheezes  CV: regular rate and rhythm, normal S1 S2, no S3 or S4, no murmur, click or rub, no peripheral edema and peripheral pulses strong  ABDOMEN: soft, nontender, no hepatosplenomegaly, no masses and bowel sounds normal  MS: no gross musculoskeletal defects noted, no edema    Plan: Appropriate to proceed as scheduled.      Champ Sutton MD  6/17/2022    PCP:  Wegener, Joel Daniel Irwin    "

## 2022-07-14 ENCOUNTER — MYC MEDICAL ADVICE (OUTPATIENT)
Dept: FAMILY MEDICINE | Facility: CLINIC | Age: 62
End: 2022-07-14

## 2022-07-14 DIAGNOSIS — I10 ESSENTIAL HYPERTENSION: ICD-10-CM

## 2022-07-14 DIAGNOSIS — I10 HYPERTENSION GOAL BP (BLOOD PRESSURE) < 140/90: ICD-10-CM

## 2022-07-14 RX ORDER — DILTIAZEM HYDROCHLORIDE 300 MG/1
CAPSULE, COATED, EXTENDED RELEASE ORAL
Qty: 90 CAPSULE | Refills: 3 | Status: SHIPPED | OUTPATIENT
Start: 2022-07-14 | End: 2022-07-19

## 2022-07-14 RX ORDER — LISINOPRIL 40 MG/1
40 TABLET ORAL DAILY
Qty: 90 TABLET | Refills: 3 | Status: SHIPPED | OUTPATIENT
Start: 2022-07-14 | End: 2022-07-19

## 2022-07-14 RX ORDER — LISINOPRIL 40 MG/1
40 TABLET ORAL DAILY
Qty: 30 TABLET | Refills: 0 | Status: SHIPPED | OUTPATIENT
Start: 2022-07-14 | End: 2022-07-14

## 2022-07-14 RX ORDER — DILTIAZEM HYDROCHLORIDE 300 MG/1
CAPSULE, COATED, EXTENDED RELEASE ORAL
Qty: 30 CAPSULE | Refills: 0 | Status: SHIPPED | OUTPATIENT
Start: 2022-07-14 | End: 2022-07-14

## 2022-07-14 NOTE — TELEPHONE ENCOUNTER
One month supply sent 6/9.  Will send in another one month    Patient informed he needs physical.  Last done 6/2021    Tracy Patten RN

## 2022-07-15 ENCOUNTER — HOSPITAL ENCOUNTER (OUTPATIENT)
Dept: CT IMAGING | Facility: CLINIC | Age: 62
Discharge: HOME | End: 2022-07-15
Attending: FAMILY MEDICINE | Admitting: FAMILY MEDICINE
Payer: COMMERCIAL

## 2022-07-15 DIAGNOSIS — R91.8 PULMONARY NODULES: ICD-10-CM

## 2022-07-15 PROCEDURE — 71250 CT THORAX DX C-: CPT

## 2022-07-15 PROCEDURE — 71250 CT THORAX DX C-: CPT | Mod: 26 | Performed by: RADIOLOGY

## 2022-07-19 RX ORDER — DILTIAZEM HYDROCHLORIDE 300 MG/1
CAPSULE, COATED, EXTENDED RELEASE ORAL
Qty: 90 CAPSULE | Refills: 3 | Status: SHIPPED | OUTPATIENT
Start: 2022-07-19 | End: 2023-07-12

## 2022-07-19 RX ORDER — LISINOPRIL 40 MG/1
40 TABLET ORAL DAILY
Qty: 90 TABLET | Refills: 3 | Status: SHIPPED | OUTPATIENT
Start: 2022-07-19 | End: 2023-07-12

## 2022-07-31 ENCOUNTER — HEALTH MAINTENANCE LETTER (OUTPATIENT)
Age: 62
End: 2022-07-31

## 2022-10-12 ENCOUNTER — IMMUNIZATION (OUTPATIENT)
Dept: FAMILY MEDICINE | Facility: CLINIC | Age: 62
End: 2022-10-12
Payer: COMMERCIAL

## 2022-10-12 PROCEDURE — 91312 COVID-19,PF,PFIZER BOOSTER BIVALENT: CPT

## 2022-10-12 PROCEDURE — 99207 PR NO CHARGE LOS: CPT

## 2022-10-12 PROCEDURE — 0124A COVID-19,PF,PFIZER BOOSTER BIVALENT: CPT

## 2022-10-15 ENCOUNTER — HEALTH MAINTENANCE LETTER (OUTPATIENT)
Age: 62
End: 2022-10-15

## 2023-04-03 DIAGNOSIS — I77.810 DILATED AORTIC ROOT (H): Primary | ICD-10-CM

## 2023-07-11 ENCOUNTER — MYC MEDICAL ADVICE (OUTPATIENT)
Dept: FAMILY MEDICINE | Facility: CLINIC | Age: 63
End: 2023-07-11
Payer: COMMERCIAL

## 2023-07-11 DIAGNOSIS — A69.20 LYME DISEASE: Primary | ICD-10-CM

## 2023-07-11 DIAGNOSIS — I10 HYPERTENSION GOAL BP (BLOOD PRESSURE) < 140/90: ICD-10-CM

## 2023-07-12 RX ORDER — LISINOPRIL 40 MG/1
40 TABLET ORAL DAILY
Qty: 90 TABLET | Refills: 1 | Status: SHIPPED | OUTPATIENT
Start: 2023-07-12 | End: 2023-10-12

## 2023-07-12 RX ORDER — DILTIAZEM HYDROCHLORIDE 300 MG/1
CAPSULE, COATED, EXTENDED RELEASE ORAL
Qty: 90 CAPSULE | Refills: 1 | Status: SHIPPED | OUTPATIENT
Start: 2023-07-12 | End: 2023-10-12

## 2023-07-12 RX ORDER — DOXYCYCLINE HYCLATE 100 MG
100 TABLET ORAL 2 TIMES DAILY
Qty: 20 TABLET | Refills: 0 | Status: SHIPPED | OUTPATIENT
Start: 2023-07-12 | End: 2023-07-22

## 2023-07-12 NOTE — TELEPHONE ENCOUNTER
JW,   Please see [employer] messages below and advise.   Thanks!  Sarina RITCHIE

## 2023-08-20 ENCOUNTER — HEALTH MAINTENANCE LETTER (OUTPATIENT)
Age: 63
End: 2023-08-20

## 2023-10-05 ASSESSMENT — ENCOUNTER SYMPTOMS
CHILLS: 0
SHORTNESS OF BREATH: 0
NAUSEA: 0
EYE PAIN: 0
DIZZINESS: 0
MYALGIAS: 0
WEAKNESS: 0
HEMATOCHEZIA: 0
HEADACHES: 0
ABDOMINAL PAIN: 0
PALPITATIONS: 1
ARTHRALGIAS: 1
SORE THROAT: 0
DYSURIA: 0
PARESTHESIAS: 0
HEARTBURN: 0
HEMATURIA: 0
JOINT SWELLING: 0
FREQUENCY: 0
FEVER: 0
CONSTIPATION: 0
NERVOUS/ANXIOUS: 0
COUGH: 0
DIARRHEA: 0

## 2023-10-09 ENCOUNTER — ALLIED HEALTH/NURSE VISIT (OUTPATIENT)
Dept: FAMILY MEDICINE | Facility: CLINIC | Age: 63
End: 2023-10-09
Payer: COMMERCIAL

## 2023-10-09 DIAGNOSIS — Z23 HIGH PRIORITY FOR 2019-NCOV VACCINE: Primary | ICD-10-CM

## 2023-10-09 PROCEDURE — 99207 PR NO CHARGE NURSE ONLY: CPT

## 2023-10-09 PROCEDURE — 90480 ADMN SARSCOV2 VAC 1/ONLY CMP: CPT

## 2023-10-09 PROCEDURE — 91320 SARSCV2 VAC 30MCG TRS-SUC IM: CPT

## 2023-10-12 ENCOUNTER — OFFICE VISIT (OUTPATIENT)
Dept: FAMILY MEDICINE | Facility: CLINIC | Age: 63
End: 2023-10-12
Payer: COMMERCIAL

## 2023-10-12 VITALS
WEIGHT: 184.6 LBS | BODY MASS INDEX: 24.46 KG/M2 | RESPIRATION RATE: 14 BRPM | TEMPERATURE: 97.1 F | HEART RATE: 51 BPM | HEIGHT: 73 IN | DIASTOLIC BLOOD PRESSURE: 83 MMHG | OXYGEN SATURATION: 99 % | SYSTOLIC BLOOD PRESSURE: 133 MMHG

## 2023-10-12 DIAGNOSIS — Z13.6 CARDIOVASCULAR SCREENING; LDL GOAL LESS THAN 130: ICD-10-CM

## 2023-10-12 DIAGNOSIS — L82.0 INFLAMED SEBORRHEIC KERATOSIS: ICD-10-CM

## 2023-10-12 DIAGNOSIS — I77.810 DILATED AORTIC ROOT (H): ICD-10-CM

## 2023-10-12 DIAGNOSIS — D3A.8 NEUROENDOCRINE TUMOR (H): ICD-10-CM

## 2023-10-12 DIAGNOSIS — I10 HYPERTENSION GOAL BP (BLOOD PRESSURE) < 140/90: ICD-10-CM

## 2023-10-12 DIAGNOSIS — Z00.00 ROUTINE GENERAL MEDICAL EXAMINATION AT A HEALTH CARE FACILITY: Primary | ICD-10-CM

## 2023-10-12 DIAGNOSIS — I47.10 SVT (SUPRAVENTRICULAR TACHYCARDIA) (H): ICD-10-CM

## 2023-10-12 DIAGNOSIS — R91.8 PULMONARY NODULES: ICD-10-CM

## 2023-10-12 PROCEDURE — 80053 COMPREHEN METABOLIC PANEL: CPT | Performed by: FAMILY MEDICINE

## 2023-10-12 PROCEDURE — 80061 LIPID PANEL: CPT | Performed by: FAMILY MEDICINE

## 2023-10-12 PROCEDURE — 82570 ASSAY OF URINE CREATININE: CPT | Performed by: FAMILY MEDICINE

## 2023-10-12 PROCEDURE — 36415 COLL VENOUS BLD VENIPUNCTURE: CPT | Performed by: FAMILY MEDICINE

## 2023-10-12 PROCEDURE — 90471 IMMUNIZATION ADMIN: CPT | Performed by: FAMILY MEDICINE

## 2023-10-12 PROCEDURE — 90686 IIV4 VACC NO PRSV 0.5 ML IM: CPT | Performed by: FAMILY MEDICINE

## 2023-10-12 PROCEDURE — 99396 PREV VISIT EST AGE 40-64: CPT | Mod: 25 | Performed by: FAMILY MEDICINE

## 2023-10-12 PROCEDURE — 82043 UR ALBUMIN QUANTITATIVE: CPT | Performed by: FAMILY MEDICINE

## 2023-10-12 PROCEDURE — 99213 OFFICE O/P EST LOW 20 MIN: CPT | Mod: 25 | Performed by: FAMILY MEDICINE

## 2023-10-12 RX ORDER — LISINOPRIL 40 MG/1
40 TABLET ORAL DAILY
Qty: 90 TABLET | Refills: 3 | Status: SHIPPED | OUTPATIENT
Start: 2023-10-12 | End: 2024-09-03

## 2023-10-12 RX ORDER — DILTIAZEM HYDROCHLORIDE 300 MG/1
CAPSULE, COATED, EXTENDED RELEASE ORAL
Qty: 90 CAPSULE | Refills: 3 | Status: SHIPPED | OUTPATIENT
Start: 2023-10-12 | End: 2024-09-03

## 2023-10-12 ASSESSMENT — ENCOUNTER SYMPTOMS
CONSTIPATION: 0
COUGH: 0
JOINT SWELLING: 0
PALPITATIONS: 1
CHILLS: 0
DIZZINESS: 0
SHORTNESS OF BREATH: 0
PARESTHESIAS: 0
EYE PAIN: 0
NERVOUS/ANXIOUS: 0
ABDOMINAL PAIN: 0
HEADACHES: 0
ARTHRALGIAS: 1
MYALGIAS: 0
HEARTBURN: 0
DIARRHEA: 0
FEVER: 0
NAUSEA: 0
WEAKNESS: 0
HEMATURIA: 0
HEMATOCHEZIA: 0
FREQUENCY: 0
SORE THROAT: 0
DYSURIA: 0

## 2023-10-12 ASSESSMENT — PAIN SCALES - GENERAL: PAINLEVEL: NO PAIN (0)

## 2023-10-12 NOTE — PATIENT INSTRUCTIONS
Svt - zio patch    Blood pressue ? Losartan, pharm D ace vs arb    Lipids/cardiac risk? - plan calcium screen    Lung nodules    Aortic root dilation      Preventive Health Recommendations  Male Ages 50 - 64    Yearly exam:             See your health care provider every year in order to  o   Review health changes.   o   Discuss preventive care.    o   Review your medicines if your doctor has prescribed any.   Have a cholesterol test every 5 years, or more frequently if you are at risk for high cholesterol/heart disease.   Have a diabetes test (fasting glucose) every three years. If you are at risk for diabetes, you should have this test more often.   Have a colonoscopy at age 45, or have a yearly FIT test (stool test). These exams will check for colon cancer.    Talk with your health care provider about whether or not a prostate cancer screening test (PSA) is right for you.  You should be tested each year for STDs (sexually transmitted diseases), if you re at risk.     Shots: Get a flu shot each year. Get a tetanus shot every 10 years.     Nutrition:  Eat at least 5 servings of fruits and vegetables daily.   Eat whole-grain bread, whole-wheat pasta and brown rice instead of white grains and rice.   Get adequate Calcium and Vitamin D.     Lifestyle  Exercise for at least 150 minutes a week (30 minutes a day, 5 days a week). This will help you control your weight and prevent disease.   Limit alcohol to one drink per day.   No smoking.   Wear sunscreen to prevent skin cancer.   See your dentist every six months for an exam and cleaning.   See your eye doctor every 1 to 2 years.

## 2023-10-12 NOTE — PROGRESS NOTES
"SUBJECTIVE:   CC: Nba is an 63 year old who presents for preventative health visit.       10/12/2023     2:17 PM   Additional Questions   Roomed by Frankie LYLES       Healthy Habits:     Getting at least 3 servings of Calcium per day:  Yes    Bi-annual eye exam:  NO    Dental care twice a year:  NO    Sleep apnea or symptoms of sleep apnea:  None    Diet:  Regular (no restrictions)    Frequency of exercise:  6-7 days/week    Duration of exercise:  45-60 minutes    Taking medications regularly:  Yes    Medication side effects:  None    Additional concerns today:  Yes      Today's PHQ-2 Score:       10/12/2023     2:17 PM   PHQ-2 ( 1999 [employer])   Q1: Little interest or pleasure in doing things 0   Q2: Feeling down, depressed or hopeless 0   PHQ-2 Score 0   Q1: Little interest or pleasure in doing things Not at all   Q2: Feeling down, depressed or hopeless Not at all   PHQ-2 Score 0                 See plan for history.       Social History     Tobacco Use    Smoking status: Never    Smokeless tobacco: Former     Quit date: 1/1/2000   Substance Use Topics    Alcohol use: Yes     Comment: 2 drinks daily             10/5/2023    10:04 AM   Alcohol Use   Prescreen: >3 drinks/day or >7 drinks/week? Yes   AUDIT SCORE  5       Last PSA: No results found for: \"PSA\"    Reviewed orders with patient. Reviewed health maintenance and updated orders accordingly - Yes  Lab work is in process    Reviewed and updated as needed this visit by clinical staff                  Reviewed and updated as needed this visit by Provider                 Past Medical History:   Diagnosis Date    Arthritis     Hypertension     SVT (supraventricular tachycardia)       Past Surgical History:   Procedure Laterality Date    ARTHROSCOPY WRIST Left 6/17/2011    Procedure: ARTHROSCOPY, WRIST;  Surgeon: Lizzy Cervantes MD;  [facility]    COLONOSCOPY  2009    nl    COLONOSCOPY N/A 3/11/2022    Procedure: COLONOSCOPY, WITH POLYPECTOMY;  Surgeon: Herman " Champ Phillips MD;  [place] [facility] [place]    OPEN REDUCTION INTERNAL FIXATION WRIST Left 6/17/2011    Procedure: OPEN REDUCTION INTERNAL FIXATION, FRACTURE, WRIST;  Surgeon: Tatiana Castro MD;  Location:  OR    ORTHOPEDIC SURGERY      REMOVE HARDWARE WRIST  1/12/2012    Procedure:REMOVE HARDWARE WRIST; Hardware Removal Left Wrist, Plate x 2, Screw  x 10 ; Surgeon:TATIANA CASTRO; [facility]       Review of Systems   Constitutional:  Negative for chills and fever.   HENT:  Positive for hearing loss. Negative for congestion, ear pain and sore throat.    Eyes:  Negative for pain and visual disturbance.   Respiratory:  Negative for cough and shortness of breath.    Cardiovascular:  Positive for palpitations and peripheral edema. Negative for chest pain.   Gastrointestinal:  Negative for abdominal pain, constipation, diarrhea, heartburn, hematochezia and nausea.   Genitourinary:  Positive for impotence. Negative for dysuria, frequency, genital sores, hematuria, penile discharge and urgency.   Musculoskeletal:  Positive for arthralgias. Negative for joint swelling and myalgias.   Skin:  Negative for rash.   Neurological:  Negative for dizziness, weakness, headaches and paresthesias.   Psychiatric/Behavioral:  Negative for mood changes. The patient is not nervous/anxious.          OBJECTIVE:   There were no vitals taken for this visit.    Physical Exam  GENERAL: healthy, alert and no distress  EYES: Eyes grossly normal to inspection, PERRL and conjunctivae and sclerae normal  HENT: ear canals and TM's normal, nose and mouth without ulcers or lesions  NECK: no adenopathy, no asymmetry, masses, or scars and thyroid normal to palpation  RESP: lungs clear to auscultation - no rales, rhonchi or wheezes  CV: regular rate and rhythm, normal S1 S2, no S3 or S4, no murmur, click or rub, no peripheral edema and peripheral pulses strong  ABDOMEN: soft, nontender, no hepatosplenomegaly, no masses and bowel  sounds normal   (male): prostate exam declined  MS: no gross musculoskeletal defects noted, no edema  SKIN: no suspicious lesions or rashes  NEURO: Normal strength and tone, mentation intact and speech normal  PSYCH: mentation appears normal, affect normal/bright        ASSESSMENT/PLAN:   (Z00.00) Routine general medical examination at a health care facility  (primary encounter diagnosis)        Svt - continues periodic symptoms , desires/agree with zio patch for surveillance re: a-fib.     Blood pressue: wondering if  Losartan superior to lisinopril however lisinopril working very well.  Discussed main objective is to have low blood pressue.  Will consider changing after calcium scan.  Will discus with pharm D ace vs arb and he plans to do lit search as well and if desires change will let me know.     Lipids overall good.   The 10-year ASCVD risk score (Ovidio HOFFMAN, et al., 2019) is: 11.4%    Values used to calculate the score:      Age: 63 years      Sex: Male      Is Non- : No      Diabetic: No      Tobacco smoker: No      Systolic Blood Pressure: 133 mmHg      Is BP treated: Yes      HDL Cholesterol: 94 mg/dL      Total Cholesterol: 237 mg/dL    However high total cholesterol /risk score as above.  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     I recommend ct coroary calcium score for risk stratification.  Disucssed risks/benefits of this test and decided to proceed.     Lung nodules: reviewed last scan found incidentally originally.  Repeat 18-24 months, not due at this time.     Aortic root dilation: has been stable since identified.  Ok to re-check 2-3 years/not due now.     Neuoendocrine tumor on colon biopsy with follow up biopsy of area negative.  No further treatment/evaluation recommended.  I would err on lower end of repeat at 3 years vs 5-7, not due now.   Labs for monitoring of chronic issues as below.     Planning shingles /rsv vaccines from  pharmacy.  Had covid.  Flu shot today.     Does NOT desire psa screening.     Has had a dermatology skin exam in addition to exam today.     Large sk on right face though and desires referral/given.  Gets irritated with shaving.     (I10) Hypertension goal BP (blood pressure) < 130/80  Comment: overall at goal per home blood pressue monitoring, as above.   Plan: Albumin Random Urine Quantitative with Creat         Ratio, Comprehensive metabolic panel, CT         Coronary Calcium Scan, diltiazem ER COATED         BEADS (CARDIZEM CD/CARTIA XT) 300 MG 24 hr         capsule, lisinopril (ZESTRIL) 40 MG tablet            (Z13.6) CARDIOVASCULAR SCREENING; LDL GOAL LESS THAN 130  Comment:   Plan: Lipid panel reflex to direct LDL Fasting, CT         Coronary Calcium Scan            (I47.10) SVT (supraventricular tachycardia)  Comment:   Plan: Adult Leadless EKG Monitor 3 to 7 Days            (I77.810) Dilated aortic root (H24)  Comment:   Plan:     (R91.8) Pulmonary nodules  Comment:   Plan:     (D3A.8) Neuroendocrine tumor (H28)  Comment: colon polyp, removed, no further treatment recommended aside from surveillance at the time.  Plan:     Patient has been advised of split billing requirements and indicates understanding: Yes    In addition to preventive care exam/counseling 25 minutes spent on the date of the encounter doing chart review, history and exam, negotiating and explaining the plan with the patient, documentation and further activities as noted above.         COUNSELING:   Reviewed preventive health counseling, as reflected in patient instructions       Regular exercise       Healthy diet/nutrition       Immunizations             Colorectal cancer screening       Prostate cancer screening       Advance Care Planning        He reports that he has never smoked. He quit smokeless tobacco use about 23 years ago.            Joel Daniel Wegener, MD  [facility]

## 2023-10-12 NOTE — NURSING NOTE
Pt received flu vaccine per provider order.   Prior to immunization administration, verified patients identity using patient s name and date of birth. Prior to administration, RN reviewed injectable influenza screening questions with the Pt - no concerns noted. Pt given VIS form.     Patient instructed to remain in clinic for 15 minutes afterwards, and to report any adverse reactions.     Performed by Jeffery Catherine RN on 10/12/2023 at 3:25 PM.

## 2023-10-13 LAB
ALBUMIN SERPL BCG-MCNC: 4.2 G/DL (ref 3.5–5.2)
ALP SERPL-CCNC: 70 U/L (ref 40–129)
ALT SERPL W P-5'-P-CCNC: 24 U/L (ref 0–70)
ANION GAP SERPL CALCULATED.3IONS-SCNC: 12 MMOL/L (ref 7–15)
AST SERPL W P-5'-P-CCNC: 33 U/L (ref 0–45)
BILIRUB SERPL-MCNC: 0.3 MG/DL
BUN SERPL-MCNC: 13.6 MG/DL (ref 8–23)
CALCIUM SERPL-MCNC: 9.5 MG/DL (ref 8.8–10.2)
CHLORIDE SERPL-SCNC: 103 MMOL/L (ref 98–107)
CHOLEST SERPL-MCNC: 197 MG/DL
CREAT SERPL-MCNC: 1 MG/DL (ref 0.67–1.17)
CREAT UR-MCNC: 89.9 MG/DL
DEPRECATED HCO3 PLAS-SCNC: 25 MMOL/L (ref 22–29)
EGFRCR SERPLBLD CKD-EPI 2021: 84 ML/MIN/1.73M2
GLUCOSE SERPL-MCNC: 90 MG/DL (ref 70–99)
HDLC SERPL-MCNC: 80 MG/DL
LDLC SERPL CALC-MCNC: 106 MG/DL
MICROALBUMIN UR-MCNC: <12 MG/L
MICROALBUMIN/CREAT UR: NORMAL MG/G{CREAT}
NONHDLC SERPL-MCNC: 117 MG/DL
POTASSIUM SERPL-SCNC: 4.4 MMOL/L (ref 3.4–5.3)
PROT SERPL-MCNC: 6.8 G/DL (ref 6.4–8.3)
SODIUM SERPL-SCNC: 140 MMOL/L (ref 135–145)
TRIGL SERPL-MCNC: 55 MG/DL

## 2023-10-31 ENCOUNTER — MYC MEDICAL ADVICE (OUTPATIENT)
Dept: FAMILY MEDICINE | Facility: CLINIC | Age: 63
End: 2023-10-31
Payer: COMMERCIAL

## 2023-10-31 ENCOUNTER — E-VISIT (OUTPATIENT)
Dept: FAMILY MEDICINE | Facility: CLINIC | Age: 63
End: 2023-10-31
Payer: COMMERCIAL

## 2023-10-31 DIAGNOSIS — F40.243 FEAR OF FLYING: Primary | ICD-10-CM

## 2023-10-31 PROCEDURE — 99207 PR NON-BILLABLE SERV PER CHARTING: CPT | Performed by: FAMILY MEDICINE

## 2023-10-31 RX ORDER — LORAZEPAM 0.5 MG/1
.5-1 TABLET ORAL EVERY 6 HOURS PRN
Qty: 20 TABLET | Refills: 0 | Status: SHIPPED | OUTPATIENT
Start: 2023-10-31 | End: 2024-09-03

## 2023-10-31 ASSESSMENT — ANXIETY QUESTIONNAIRES
3. WORRYING TOO MUCH ABOUT DIFFERENT THINGS: SEVERAL DAYS
1. FEELING NERVOUS, ANXIOUS, OR ON EDGE: SEVERAL DAYS
4. TROUBLE RELAXING: SEVERAL DAYS
6. BECOMING EASILY ANNOYED OR IRRITABLE: NOT AT ALL
7. FEELING AFRAID AS IF SOMETHING AWFUL MIGHT HAPPEN: SEVERAL DAYS
GAD7 TOTAL SCORE: 5
7. FEELING AFRAID AS IF SOMETHING AWFUL MIGHT HAPPEN: SEVERAL DAYS
5. BEING SO RESTLESS THAT IT IS HARD TO SIT STILL: NOT AT ALL
GAD7 TOTAL SCORE: 5
8. IF YOU CHECKED OFF ANY PROBLEMS, HOW DIFFICULT HAVE THESE MADE IT FOR YOU TO DO YOUR WORK, TAKE CARE OF THINGS AT HOME, OR GET ALONG WITH OTHER PEOPLE?: SOMEWHAT DIFFICULT
2. NOT BEING ABLE TO STOP OR CONTROL WORRYING: SEVERAL DAYS
GAD7 TOTAL SCORE: 5

## 2023-10-31 NOTE — TELEPHONE ENCOUNTER
JW,      Please see Mychart message      Imaging has been ordered.  Lorazepam: Evisit advised.    KRISTOPHER has never prescribed  Tracy Patten RN      See patient message regarding evisit.

## 2023-11-01 ASSESSMENT — ANXIETY QUESTIONNAIRES: GAD7 TOTAL SCORE: 5

## 2023-11-26 ENCOUNTER — MYC MEDICAL ADVICE (OUTPATIENT)
Dept: FAMILY MEDICINE | Facility: CLINIC | Age: 63
End: 2023-11-26
Payer: COMMERCIAL

## 2023-11-26 DIAGNOSIS — Z91.89 AT HIGH RISK FOR CORONARY ARTERY DISEASE: ICD-10-CM

## 2023-11-26 DIAGNOSIS — I10 ESSENTIAL HYPERTENSION: ICD-10-CM

## 2023-11-26 DIAGNOSIS — E78.5 HYPERLIPIDEMIA LDL GOAL <130: ICD-10-CM

## 2023-11-26 DIAGNOSIS — I47.10 PAROXYSMAL SUPRAVENTRICULAR TACHYCARDIA (H): ICD-10-CM

## 2023-11-26 DIAGNOSIS — R07.9 CHEST PAIN, UNSPECIFIED TYPE: Primary | ICD-10-CM

## 2023-11-27 NOTE — TELEPHONE ENCOUNTER
The 10-year ASCVD risk score (Ovidio HOFFMAN, et al., 2019) is: 11%    Values used to calculate the score:      Age: 63 years      Sex: Male      Is Non- : No      Diabetic: No      Tobacco smoker: No      Systolic Blood Pressure: 133 mmHg      Is BP treated: Yes      HDL Cholesterol: 80 mg/dL      Total Cholesterol: 197 mg/dL

## 2023-11-30 ENCOUNTER — HOSPITAL ENCOUNTER (OUTPATIENT)
Dept: CARDIOLOGY | Facility: CLINIC | Age: 63
Discharge: HOME | End: 2023-11-30
Attending: FAMILY MEDICINE
Payer: COMMERCIAL

## 2023-11-30 ENCOUNTER — HOSPITAL ENCOUNTER (OUTPATIENT)
Dept: CT IMAGING | Facility: CLINIC | Age: 63
Discharge: HOME | End: 2023-11-30
Attending: FAMILY MEDICINE
Payer: COMMERCIAL

## 2023-11-30 DIAGNOSIS — Z13.6 CARDIOVASCULAR SCREENING; LDL GOAL LESS THAN 130: ICD-10-CM

## 2023-11-30 DIAGNOSIS — I10 HYPERTENSION GOAL BP (BLOOD PRESSURE) < 140/90: ICD-10-CM

## 2023-11-30 DIAGNOSIS — I47.10 SVT (SUPRAVENTRICULAR TACHYCARDIA) (H): ICD-10-CM

## 2023-11-30 PROCEDURE — 99207 LEADLESS EKG MONITOR 3 TO 7 DAYS: CPT | Performed by: INTERNAL MEDICINE

## 2023-11-30 PROCEDURE — 93242 EXT ECG>48HR<7D RECORDING: CPT

## 2023-11-30 PROCEDURE — 75571 CT HRT W/O DYE W/CA TEST: CPT | Mod: 26 | Performed by: STUDENT IN AN ORGANIZED HEALTH CARE EDUCATION/TRAINING PROGRAM

## 2023-11-30 PROCEDURE — 75571 CT HRT W/O DYE W/CA TEST: CPT

## 2023-12-15 ENCOUNTER — OFFICE VISIT (OUTPATIENT)
Dept: DERMATOLOGY | Facility: CLINIC | Age: 63
End: 2023-12-15
Attending: FAMILY MEDICINE
Payer: COMMERCIAL

## 2023-12-15 DIAGNOSIS — L82.0 INFLAMED SEBORRHEIC KERATOSIS: ICD-10-CM

## 2023-12-15 DIAGNOSIS — L57.0 ACTINIC KERATOSES: Primary | ICD-10-CM

## 2023-12-15 PROCEDURE — 17000 DESTRUCT PREMALG LESION: CPT | Mod: XS | Performed by: PHYSICIAN ASSISTANT

## 2023-12-15 PROCEDURE — 17110 DESTRUCTION B9 LES UP TO 14: CPT | Performed by: PHYSICIAN ASSISTANT

## 2023-12-15 ASSESSMENT — PAIN SCALES - GENERAL: PAINLEVEL: NO PAIN (0)

## 2023-12-15 NOTE — PROGRESS NOTES
[facility] Dermatology Note  Encounter Date: Dec 15, 2023  Office Visit     Reviewed patients past medical history and pertinent chart review prior to patients visit today.     Dermatology Problem List:  Inflamed seborrheic keratoses  - cryotherapy 12/15/2023  Actinic keratosis  - cryotherapy 12/15/2023    No personal history of skin cancer  Family history basal cell carcinoma, brother.     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  I recommended liquid nitrogen cryotherapy and the patient was agreeable.  Care reviewed.     # Inflamed seborrheic keratoses x2  The benign nature of the skin lesion(s) was discussed with the patient.  Due to the inflamed and irritated nature of the lesions I recommend cryotherapy and the patient was agreeable.  Care reviewed.  We discussed alternative means to treat the inflamed seborrheic keratoses including shave removal and gentle electrocautery and curettage, and the patient elected to undergo cryotherapy.      Cryotherapy procedure note, location(s): AK vertex scalp x1; ISK right cheek x1, left temportal scalp x1 After verbal consent and discussion of risks and benefits including, but not limited to, dyspigmentation/scar, blister, and pain, the lesion(s) was(were) treated with 1-2 mm freeze border for 1-2 cycles with liquid nitrogen. Post cryotherapy instructions were provided.    Yary Garcia PA-C  Dermatology    _______________________________________    CC: Derm Problem (Nba is here to have a lesion on his face evaluated.)    HPI:  Mr. Robin Olmedo is a(n) 63 year old male who presents today as a new patient for a lesion on the right cheek. The lesion has been present for years and is bothersome for the patient. He is interested in removal options.     Additionally, he notes a rough spot that  comes and goes on his vertex scalp. This is asymptomatic.     The patient kindly declined a full skin cancer screening evaluation, instead requests evaluation of the scalp and right cheek. Patient denies additional skin concerns.      Physical Exam:  SKIN: Focused examination of right cheek and scalp was performed.  - The left temporal scalp x1 and right cheek x1 demonstrates waxy papule(s) with an erythematous base, consistent with inflamed seborrheic keratoses.   - Pink macule(s) with gritty scale involving the vertex scalp x1, consistent with actinic keratosis.   - No other lesions of concern on areas examined.     Medications:  Current Outpatient Medications   Medication    diltiazem ER COATED BEADS (CARDIZEM CD/CARTIA XT) 300 MG 24 hr capsule    lisinopril (ZESTRIL) 40 MG tablet    LORazepam (ATIVAN) 0.5 MG tablet     No current facility-administered medications for this visit.      Past Medical History:   Patient Active Problem List   Diagnosis    Paroxysmal supraventricular tachycardia    CARDIOVASCULAR SCREENING; LDL GOAL LESS THAN 130    Essential hypertension    Transient acantholytic dermatosis (hong)    Dilated aortic root (H24)     Past Medical History:   Diagnosis Date    Arthritis     Hypertension     SVT (supraventricular tachycardia)        CC Joel Daniel Irwin Wegener, MD  [address]  [place] [address] on close of this encounter.

## 2023-12-15 NOTE — NURSING NOTE
Dermatology Rooming Note    Robin Olmedo's goals for this visit include:   Chief Complaint   Patient presents with    Derm Problem     Nba is here to have a lesion on his face evaluated.     Nathan Bhatt, EMT

## 2023-12-15 NOTE — LETTER
12/15/2023       RE: Robin Olmedo  [address]  [place] [address]     Dear Colleague,    Thank you for referring your patient, Robin Olmedo, to the [facility] DERMATOLOGY CLINIC [place] at [facility]. Please see a copy of my visit note below.    [facility] Dermatology Note  Encounter Date: Dec 15, 2023  Office Visit     Reviewed patients past medical history and pertinent chart review prior to patients visit today.     Dermatology Problem List:  Inflamed seborrheic keratoses  - cryotherapy 12/15/2023  Actinic keratosis  - cryotherapy 12/15/2023    No personal history of skin cancer  Family history basal cell carcinoma, brother.     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  I recommended liquid nitrogen cryotherapy and the patient was agreeable.  Care reviewed.     # Inflamed seborrheic keratoses x2  The benign nature of the skin lesion(s) was discussed with the patient.  Due to the inflamed and irritated nature of the lesions I recommend cryotherapy and the patient was agreeable.  Care reviewed.  We discussed alternative means to treat the inflamed seborrheic keratoses including shave removal and gentle electrocautery and curettage, and the patient elected to undergo cryotherapy.      Cryotherapy procedure note, location(s): AK vertex scalp x1; ISK right cheek x1, left temportal scalp x1 After verbal consent and discussion of risks and benefits including, but not limited to, dyspigmentation/scar, blister, and pain, the lesion(s) was(were) treated with 1-2 mm freeze border for 1-2 cycles with liquid nitrogen. Post cryotherapy instructions were provided.    Yary Garcia PA-C  Dermatology    _______________________________________    CC: Derm Problem (Nba is  here to have a lesion on his face evaluated.)    HPI:  Mr. Robin Olmedo is a(n) 63 year old male who presents today as a new patient for a lesion on the right cheek. The lesion has been present for years and is bothersome for the patient. He is interested in removal options.     Additionally, he notes a rough spot that comes and goes on his vertex scalp. This is asymptomatic.     The patient kindly declined a full skin cancer screening evaluation, instead requests evaluation of the scalp and right cheek. Patient denies additional skin concerns.      Physical Exam:  SKIN: Focused examination of right cheek and scalp was performed.  - The left temporal scalp x1 and right cheek x1 demonstrates waxy papule(s) with an erythematous base, consistent with inflamed seborrheic keratoses.   - Pink macule(s) with gritty scale involving the vertex scalp x1, consistent with actinic keratosis.   - No other lesions of concern on areas examined.     Medications:  Current Outpatient Medications   Medication    diltiazem ER COATED BEADS (CARDIZEM CD/CARTIA XT) 300 MG 24 hr capsule    lisinopril (ZESTRIL) 40 MG tablet    LORazepam (ATIVAN) 0.5 MG tablet     No current facility-administered medications for this visit.      Past Medical History:   Patient Active Problem List   Diagnosis    Paroxysmal supraventricular tachycardia    CARDIOVASCULAR SCREENING; LDL GOAL LESS THAN 130    Essential hypertension    Transient acantholytic dermatosis (hong)    Dilated aortic root (H24)     Past Medical History:   Diagnosis Date    Arthritis     Hypertension     SVT (supraventricular tachycardia)        CC Joel Daniel Irwin Wegener, MD  [address]  [place] [address] on close of this encounter.

## 2024-01-26 ENCOUNTER — OFFICE VISIT (OUTPATIENT)
Dept: DERMATOLOGY | Facility: CLINIC | Age: 64
End: 2024-01-26
Attending: PHYSICIAN ASSISTANT
Payer: COMMERCIAL

## 2024-01-26 DIAGNOSIS — L82.0 INFLAMED SEBORRHEIC KERATOSIS: Primary | ICD-10-CM

## 2024-01-26 PROCEDURE — 17110 DESTRUCTION B9 LES UP TO 14: CPT | Performed by: PHYSICIAN ASSISTANT

## 2024-01-26 ASSESSMENT — PAIN SCALES - GENERAL: PAINLEVEL: NO PAIN (0)

## 2024-01-26 NOTE — LETTER
1/26/2024       RE: Robin Olmedo  [address]  [place] [address]     Dear Colleague,    Thank you for referring your patient, Robin Olmedo, to the [facility] DERMATOLOGY CLINIC [place] at [facility]. Please see a copy of my visit note below.    [facility] Dermatology Note  Encounter Date: Jan 26, 2024  Office Visit     Reviewed patients past medical history and pertinent chart review prior to patients visit today.     Dermatology Problem List:  Inflamed seborrheic keratoses  - cryotherapy 12/15/2023, 1/26/2024   Actinic keratosis  - cryotherapy 12/15/2023     No personal history of skin cancer  Family history basal cell carcinoma, brother.      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  Due to the inflamed and irritated nature of the lesions I recommend cryotherapy and the patient was agreeable.      Cryotherapy procedure note, location(s): right cheek x1, right temporal scalp x1, left temporal scalp x2, vertex scalp x2. After verbal consent and discussion of risks and benefits including, but not limited to, dyspigmentation/scar, blister, and pain, the lesion(s) was(were) treated with 1-2 mm freeze border for 1-2 cycles with liquid nitrogen. Post cryotherapy instructions were provided.    All risks, benefits and alternatives were discussed with patient.  Patient is in agreement and understands the assessment and plan.  All questions were answered.  Yary Garcia PA-C  [facility] Dermatology  _______________________________________    CC: Derm Problem (AK and SK follow up on scalp and right cheek. )    HPI:  Mr. Robin Olmedo is a(n) 63 year old male who presents today as a return patient for recheck of seborrheic keratoses  last treated with cryotherapy 12/15/2023. Patient is otherwise feeling well, without additional skin concerns.      Physical Exam:  SKIN: Focused examination of face and scalp was performed.  - The ight cheek x1, right temporal scalp x1, left temporal scalp x2, vertex scalp x2 demonstrates waxy papule(s) with an erythematous base, consistent with inflamed seborrheic keratoses.       - No other lesions of concern on areas examined.     Medications:  Current Outpatient Medications   Medication    diltiazem ER COATED BEADS (CARDIZEM CD/CARTIA XT) 300 MG 24 hr capsule    lisinopril (ZESTRIL) 40 MG tablet    LORazepam (ATIVAN) 0.5 MG tablet     No current facility-administered medications for this visit.      Past Medical History:   Patient Active Problem List   Diagnosis    Paroxysmal supraventricular tachycardia    CARDIOVASCULAR SCREENING; LDL GOAL LESS THAN 130    Essential hypertension    Transient acantholytic dermatosis (hong)    Dilated aortic root (H24)     Past Medical History:   Diagnosis Date    Arthritis     Hypertension     SVT (supraventricular tachycardia)        XAVIER Garcia PA-C   2 Hour(s) 36 Minute(s)

## 2024-01-26 NOTE — PROGRESS NOTES
[facility] Dermatology Note  Encounter Date: Jan 26, 2024  Office Visit     Reviewed patients past medical history and pertinent chart review prior to patients visit today.     Dermatology Problem List:  Inflamed seborrheic keratoses  - cryotherapy 12/15/2023, 1/26/2024   Actinic keratosis  - cryotherapy 12/15/2023     No personal history of skin cancer  Family history basal cell carcinoma, brother.      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  Due to the inflamed and irritated nature of the lesions I recommend cryotherapy and the patient was agreeable.      Cryotherapy procedure note, location(s): right cheek x1, right temporal scalp x1, left temporal scalp x2, vertex scalp x2. After verbal consent and discussion of risks and benefits including, but not limited to, dyspigmentation/scar, blister, and pain, the lesion(s) was(were) treated with 1-2 mm freeze border for 1-2 cycles with liquid nitrogen. Post cryotherapy instructions were provided.    All risks, benefits and alternatives were discussed with patient.  Patient is in agreement and understands the assessment and plan.  All questions were answered.  Yary Garcia PA-C  [facility] Dermatology  _______________________________________    CC: Derm Problem (AK and SK follow up on scalp and right cheek. )    HPI:  Mr. Robin Olmedo is a(n) 63 year old male who presents today as a return patient for recheck of seborrheic keratoses last treated with cryotherapy 12/15/2023. Patient is otherwise feeling well, without additional skin concerns.      Physical Exam:  SKIN: Focused examination of face and scalp was performed.  - The ight cheek x1, right temporal scalp x1, left temporal scalp x2, vertex scalp x2 demonstrates waxy papule(s) with an  erythematous base, consistent with inflamed seborrheic keratoses.       - No other lesions of concern on areas examined.     Medications:  Current Outpatient Medications   Medication    diltiazem ER COATED BEADS (CARDIZEM CD/CARTIA XT) 300 MG 24 hr capsule    lisinopril (ZESTRIL) 40 MG tablet    LORazepam (ATIVAN) 0.5 MG tablet     No current facility-administered medications for this visit.      Past Medical History:   Patient Active Problem List   Diagnosis    Paroxysmal supraventricular tachycardia    CARDIOVASCULAR SCREENING; LDL GOAL LESS THAN 130    Essential hypertension    Transient acantholytic dermatosis (hong)    Dilated aortic root (H24)     Past Medical History:   Diagnosis Date    Arthritis     Hypertension     SVT (supraventricular tachycardia)        CC Yary Garcia PA-C  [address] [facility] [address], [facility] [address]  [place] [address] on close of this encounter.

## 2024-01-26 NOTE — NURSING NOTE
Dermatology Rooming Note    Robin Olmedo's goals for this visit include:   Chief Complaint   Patient presents with    Derm Problem     AK and SK follow up on scalp and right cheek.      Vandana Coulter, CMA

## 2024-02-21 ENCOUNTER — TELEPHONE (OUTPATIENT)
Dept: CARDIOLOGY | Facility: CLINIC | Age: 64
End: 2024-02-21
Payer: COMMERCIAL

## 2024-02-21 NOTE — TELEPHONE ENCOUNTER
Left Voicemail (1st Attempt) and Sent Mychart (1st Attempt) for the patient to call back and schedule the following:    Appointment type: New EP  Provider: Navya  Return date: Around 4/3/2024 or first available  Specialty phone number: [phone] option 1  Additional appointment(s) needed: Echo complete prior  Additional Notes: N/A    Last visit in 2020 so follow-up will need to be scheduled as New EP instead of Return EP. Ok to schedule per clinical team.    Neptali Fernando on 2/21/2024 at 3:07 PM

## 2024-02-24 ENCOUNTER — TELEPHONE (OUTPATIENT)
Dept: CARDIOLOGY | Facility: CLINIC | Age: 64
End: 2024-02-24
Payer: COMMERCIAL

## 2024-02-24 NOTE — TELEPHONE ENCOUNTER
DEVIN and sent [employer] to schedule follow up with Dr. Mendosa in April with an ECHO prior to the visit-MS

## 2024-04-12 ENCOUNTER — OFFICE VISIT (OUTPATIENT)
Dept: DERMATOLOGY | Facility: CLINIC | Age: 64
End: 2024-04-12
Attending: PHYSICIAN ASSISTANT
Payer: COMMERCIAL

## 2024-04-12 DIAGNOSIS — L57.0 ACTINIC KERATOSES: Primary | ICD-10-CM

## 2024-04-12 DIAGNOSIS — L82.0 INFLAMED SEBORRHEIC KERATOSIS: ICD-10-CM

## 2024-04-12 PROCEDURE — 17000 DESTRUCT PREMALG LESION: CPT | Mod: XS | Performed by: PHYSICIAN ASSISTANT

## 2024-04-12 PROCEDURE — 17110 DESTRUCTION B9 LES UP TO 14: CPT | Performed by: PHYSICIAN ASSISTANT

## 2024-04-12 ASSESSMENT — PAIN SCALES - GENERAL: PAINLEVEL: NO PAIN (0)

## 2024-04-12 NOTE — PROGRESS NOTES
[facility] Dermatology Note  Encounter Date: Apr 12, 2024  Office Visit     Reviewed patients past medical history and pertinent chart review prior to patients visit today.     Dermatology Problem List:  Inflamed seborrheic keratoses  - cryotherapy 12/15/2023, 1/26/2024, 4/12/2024    Actinic keratosis  - cryotherapy 12/15/2023, 4/12/2024      No personal history of skin cancer  Family history basal cell carcinoma, brother.      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  Due to the inflamed and irritated nature of the lesions I recommend cryotherapy and the patient was agreeable.      Cryotherapy procedure note, location(s): left forehead x2, left temple x1, right cheek x1 After verbal consent and discussion of risks and benefits including, but not limited to, dyspigmentation/scar, blister, and pain, the lesion(s) was(were) treated with 1-2 mm freeze border for 1-2 cycles with liquid nitrogen. Post cryotherapy instructions were provided.    #Actinic keratosis x1  - We discussed the precancerous nature of the skin lesions.  I recommended liquid nitrogen cryotherapy and the patient was agreeable.      Cryotherapy procedure note, location(s): right cheek x1 After verbal consent and discussion of risks and benefits including, but not limited to, dyspigmentation/scar, blister, and pain, the lesion(s) was(were) treated with 1-2 mm freeze border for 1-2 cycles with liquid nitrogen. Post cryotherapy instructions were provided.      All risks, benefits and alternatives were discussed with patient.  Patient is in agreement and understands the assessment and plan.  All questions were answered.  Yary Garcia PA-C  [facility] Dermatology  _______________________________________    CC: Derm Problem (Nba is here  today for Cryo treatment )    HPI:  Mr. Robin Olmedo is a(n) 63 year old male who presents today as a return patient for rough lesions on the face. The lesions are bothersome and he would like removal. Patient is otherwise feeling well, without additional skin concerns.      Physical Exam:  SKIN: Focused examination of face was performed.  - The left forehead x2, left temple x1, right cheek x1 demonstrates waxy papule(s) with an erythematous base, consistent with inflamed seborrheic keratoses.   - Pink macule(s) with gritty scale involving the right cheek x1, consistent with actinic keratosis.     - No other lesions of concern on areas examined.     Medications:  Current Outpatient Medications   Medication Sig Dispense Refill    diltiazem ER COATED BEADS (CARDIZEM CD/CARTIA XT) 300 MG 24 hr capsule TAKE 1 CAPSULE(300 MG) BY MOUTH DAILY 90 capsule 3    lisinopril (ZESTRIL) 40 MG tablet Take 1 tablet (40 mg) by mouth daily 90 tablet 3    LORazepam (ATIVAN) 0.5 MG tablet Take 1-2 tablets (0.5-1 mg) by mouth every 6 hours as needed (flying phobia/anxiety) 20 tablet 0     No current facility-administered medications for this visit.      Past Medical History:   Patient Active Problem List   Diagnosis    Paroxysmal supraventricular tachycardia (H24)    CARDIOVASCULAR SCREENING; LDL GOAL LESS THAN 130    Essential hypertension    Transient acantholytic dermatosis (hong)    Dilated aortic root (H24)     Past Medical History:   Diagnosis Date    Arthritis     Hypertension     SVT (supraventricular tachycardia) (H24)        CC Yary Garcia PA-C  [address] [facility] [address], [facility] [address]  [place] [address] on close of this encounter.

## 2024-04-12 NOTE — NURSING NOTE
Dermatology Rooming Note    Robin Olmedo's goals for this visit include:   Chief Complaint   Patient presents with    Derm Problem     Nba is here today for Cryo treatment      ARACELI Mercado

## 2024-04-12 NOTE — LETTER
4/12/2024       RE: Robin Olmedo  [address]  [place] [address]     Dear Colleague,    Thank you for referring your patient, Robin Olmedo, to the [facility] DERMATOLOGY CLINIC [place] at [facility]. Please see a copy of my visit note below.    [facility] Dermatology Note  Encounter Date: Apr 12, 2024  Office Visit     Reviewed patients past medical history and pertinent chart review prior to patients visit today.     Dermatology Problem List:  Inflamed seborrheic keratoses  - cryotherapy 12/15/2023, 1/26/2024, 4/12/2024    Actinic keratosis  - cryotherapy 12/15/2023, 4/12/2024      No personal history of skin cancer  Family history basal cell carcinoma, brother.      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  Due to the inflamed and irritated nature of the lesions I recommend cryotherapy and the patient was agreeable.      Cryotherapy procedure note, location(s): left forehead x2, left temple x1, right cheek x1 After verbal consent and discussion of risks and benefits including, but not limited to, dyspigmentation/scar, blister, and pain, the lesion(s) was(were) treated with 1-2 mm freeze border for 1-2 cycles with liquid nitrogen. Post cryotherapy instructions were provided.    #Actinic keratosis x1  - We discussed the precancerous nature of the skin lesions.  I recommended liquid nitrogen cryotherapy and the patient was agreeable.      Cryotherapy procedure note, location(s): right cheek x1 After verbal consent and discussion of risks and benefits including, but not limited to, dyspigmentation/scar, blister, and pain, the lesion(s) was(were) treated with 1-2 mm freeze border for 1-2 cycles with liquid nitrogen. Post cryotherapy  instructions were provided.      All risks, benefits and alternatives were discussed with patient.  Patient is in agreement and understands the assessment and plan.  All questions were answered.  Yary Garcia PA-C  [facility] Dermatology  _______________________________________    CC: Derm Problem (Nba is here today for Cryo treatment )    HPI:  Mr. Robin Olmedo is a(n) 63 year old male who presents today as a return patient for rough lesions on the face. The lesions are bothersome and he would like removal. Patient is otherwise feeling well, without additional skin concerns.      Physical Exam:  SKIN: Focused examination of face was performed.  - The left forehead x2, left temple x1, right cheek x1 demonstrates waxy papule(s) with an erythematous base, consistent with inflamed seborrheic keratoses.   - Pink macule(s) with gritty scale involving the right cheek x1, consistent with actinic keratosis.     - No other lesions of concern on areas examined.     Medications:  Current Outpatient Medications   Medication Sig Dispense Refill    diltiazem ER COATED BEADS (CARDIZEM CD/CARTIA XT) 300 MG 24 hr capsule TAKE 1 CAPSULE(300 MG) BY MOUTH DAILY 90 capsule 3    lisinopril (ZESTRIL) 40 MG tablet Take 1 tablet (40 mg) by mouth daily 90 tablet 3    LORazepam (ATIVAN) 0.5 MG tablet Take 1-2 tablets (0.5-1 mg) by mouth every 6 hours as needed (flying phobia/anxiety) 20 tablet 0     No current facility-administered medications for this visit.      Past Medical History:   Patient Active Problem List   Diagnosis    Paroxysmal supraventricular tachycardia (H24)    CARDIOVASCULAR SCREENING; LDL GOAL LESS THAN 130    Essential hypertension    Transient acantholytic dermatosis (hong)    Dilated aortic root (H24)     Past Medical History:   Diagnosis Date    Arthritis     Hypertension     SVT (supraventricular tachycardia) (H24)        CC Yary Garcia PA-C  [address] [facility] [address], [facility] [address]  [place]  [address] on close of this encounter.

## 2024-05-22 ENCOUNTER — TELEPHONE (OUTPATIENT)
Dept: CARDIOLOGY | Facility: CLINIC | Age: 64
End: 2024-05-22
Payer: COMMERCIAL

## 2024-05-22 NOTE — TELEPHONE ENCOUNTER
5/22 Left Voicemail (1st Attempt) and MyChart (1st Attempt)  for the patient to call back and schedule the following:    Appointment type: Return EP   Provider: Navya  Return date: October   Specialty phone number: [phone] opt 1  Additional appointment(s) needed: n/a  Additonal Notes: n/a

## 2024-05-24 ENCOUNTER — TELEPHONE (OUTPATIENT)
Dept: CARDIOLOGY | Facility: CLINIC | Age: 64
End: 2024-05-24
Payer: COMMERCIAL

## 2024-05-24 NOTE — TELEPHONE ENCOUNTER
5/24 max attempts reached Left Voicemail (2nd Attempt) and sent a letter for the patient to call back and schedule the following:    Appointment type: Return Cardiology  Provider: Navya  Return date: next available   Specialty phone number: [phone] opt 1  Additional appointment(s) needed: n/a  Additonal Notes: n/a

## 2024-07-02 ENCOUNTER — ANCILLARY PROCEDURE (OUTPATIENT)
Dept: CARDIOLOGY | Facility: CLINIC | Age: 64
End: 2024-07-02
Attending: INTERNAL MEDICINE
Payer: COMMERCIAL

## 2024-07-02 DIAGNOSIS — I77.810 DILATED AORTIC ROOT (H): ICD-10-CM

## 2024-07-02 LAB — LVEF ECHO: NORMAL

## 2024-07-02 PROCEDURE — 93306 TTE W/DOPPLER COMPLETE: CPT | Performed by: INTERNAL MEDICINE

## 2024-07-11 ENCOUNTER — TELEPHONE (OUTPATIENT)
Dept: CARDIOLOGY | Facility: CLINIC | Age: 64
End: 2024-07-11

## 2024-07-11 NOTE — TELEPHONE ENCOUNTER
7/15/2024 5:34PM Francy Beltran  Left Voicemail (2nd Attempt) and Left Voicemail with Family Member (2nd Attempt) for the patient to call back and schedule the following:    Appointment type: New Cardiology  Provider: Gen Tara HENLEY  Return date: Next available  Specialty phone number: [phone] option 1  Additional appointment(s) needed: NA  Additonal Notes: 7/15 MAX ATTEMPTS REACHED- LVM for pt to schedule New Cardiology w/ gen taar HENLEY. MAYRA Beltran 7/15/2024 5:34PM        7/11/2024 12:50PM Francy Beltran  Left Voicemail (1st Attempt), Left Voicemail with Family Member (1st Attempt), and Sent Mychart (1st Attempt) for the patient to call back and schedule the following:    Appointment type: New Cardiology  Provider: Gen Tara HENLEY  Return date: Next available  Specialty phone number: [phone] option 1  Additional appointment(s) needed: NA  Additonal Notes: 7/11 LVM and MYC for pt to schedule New Cardiology w/ gen tara HENLEY. MAYRA Beltran 7/11/2024 12:50PM

## 2024-07-22 ENCOUNTER — MYC MEDICAL ADVICE (OUTPATIENT)
Dept: FAMILY MEDICINE | Facility: CLINIC | Age: 64
End: 2024-07-22
Payer: COMMERCIAL

## 2024-07-22 DIAGNOSIS — I10 HYPERTENSION GOAL BP (BLOOD PRESSURE) < 130/80: ICD-10-CM

## 2024-07-22 DIAGNOSIS — D75.1 POLYCYTHEMIA: ICD-10-CM

## 2024-07-22 DIAGNOSIS — I10 ESSENTIAL HYPERTENSION: ICD-10-CM

## 2024-07-22 DIAGNOSIS — Z13.6 CARDIOVASCULAR SCREENING; LDL GOAL LESS THAN 130: Primary | ICD-10-CM

## 2024-07-22 NOTE — TELEPHONE ENCOUNTER
JW,      Please see [employer] message.  Patient's physical with you is 10/11  Some lab orders have been T'd up    Thank you,  Tracy Patten RN

## 2024-08-13 ENCOUNTER — LAB (OUTPATIENT)
Dept: LAB | Facility: CLINIC | Age: 64
End: 2024-08-13
Payer: COMMERCIAL

## 2024-08-13 DIAGNOSIS — D75.1 POLYCYTHEMIA: ICD-10-CM

## 2024-08-13 DIAGNOSIS — I10 HYPERTENSION GOAL BP (BLOOD PRESSURE) < 130/80: ICD-10-CM

## 2024-08-13 DIAGNOSIS — Z13.6 CARDIOVASCULAR SCREENING; LDL GOAL LESS THAN 130: ICD-10-CM

## 2024-08-13 LAB
ALBUMIN SERPL BCG-MCNC: 4.1 G/DL (ref 3.5–5.2)
ALP SERPL-CCNC: 70 U/L (ref 40–150)
ALT SERPL W P-5'-P-CCNC: 18 U/L (ref 0–70)
ANION GAP SERPL CALCULATED.3IONS-SCNC: 9 MMOL/L (ref 7–15)
AST SERPL W P-5'-P-CCNC: 29 U/L (ref 0–45)
BASOPHILS # BLD AUTO: 0 10E3/UL (ref 0–0.2)
BASOPHILS NFR BLD AUTO: 0 %
BILIRUB SERPL-MCNC: 0.4 MG/DL
BUN SERPL-MCNC: 14.8 MG/DL (ref 8–23)
CALCIUM SERPL-MCNC: 9.1 MG/DL (ref 8.8–10.4)
CHLORIDE SERPL-SCNC: 106 MMOL/L (ref 98–107)
CHOLEST SERPL-MCNC: 197 MG/DL
CREAT SERPL-MCNC: 0.95 MG/DL (ref 0.67–1.17)
CREAT UR-MCNC: 120 MG/DL
EGFRCR SERPLBLD CKD-EPI 2021: 89 ML/MIN/1.73M2
EOSINOPHIL # BLD AUTO: 0.2 10E3/UL (ref 0–0.7)
EOSINOPHIL NFR BLD AUTO: 3 %
ERYTHROCYTE [DISTWIDTH] IN BLOOD BY AUTOMATED COUNT: 11.6 % (ref 10–15)
FASTING STATUS PATIENT QL REPORTED: YES
FASTING STATUS PATIENT QL REPORTED: YES
GLUCOSE SERPL-MCNC: 98 MG/DL (ref 70–99)
HCO3 SERPL-SCNC: 24 MMOL/L (ref 22–29)
HCT VFR BLD AUTO: 45 % (ref 40–53)
HDLC SERPL-MCNC: 86 MG/DL
HGB BLD-MCNC: 15.4 G/DL (ref 13.3–17.7)
HOLD SPECIMEN: NORMAL
IMM GRANULOCYTES # BLD: 0 10E3/UL
IMM GRANULOCYTES NFR BLD: 0 %
INTERPRETATION: NORMAL
LDLC SERPL CALC-MCNC: 101 MG/DL
LYMPHOCYTES # BLD AUTO: 1.8 10E3/UL (ref 0.8–5.3)
LYMPHOCYTES NFR BLD AUTO: 36 %
MCH RBC QN AUTO: 31.2 PG (ref 26.5–33)
MCHC RBC AUTO-ENTMCNC: 34.2 G/DL (ref 31.5–36.5)
MCV RBC AUTO: 91 FL (ref 78–100)
MICROALBUMIN UR-MCNC: <12 MG/L
MICROALBUMIN/CREAT UR: NORMAL MG/G{CREAT}
MONOCYTES # BLD AUTO: 0.6 10E3/UL (ref 0–1.3)
MONOCYTES NFR BLD AUTO: 13 %
NEUTROPHILS # BLD AUTO: 2.3 10E3/UL (ref 1.6–8.3)
NEUTROPHILS NFR BLD AUTO: 48 %
NONHDLC SERPL-MCNC: 111 MG/DL
PLATELET # BLD AUTO: 186 10E3/UL (ref 150–450)
POTASSIUM SERPL-SCNC: 4.8 MMOL/L (ref 3.4–5.3)
PROT SERPL-MCNC: 6.6 G/DL (ref 6.4–8.3)
RBC # BLD AUTO: 4.94 10E6/UL (ref 4.4–5.9)
SIGNIFICANT RESULTS: NORMAL
SODIUM SERPL-SCNC: 139 MMOL/L (ref 135–145)
SPECIMEN DESCRIPTION: NORMAL
TEST DETAILS, MDL: NORMAL
TRIGL SERPL-MCNC: 50 MG/DL
WBC # BLD AUTO: 4.9 10E3/UL (ref 4–11)

## 2024-08-13 PROCEDURE — 81270 JAK2 GENE: CPT

## 2024-08-13 PROCEDURE — G0452 MOLECULAR PATHOLOGY INTERPR: HCPCS | Mod: 59 | Performed by: PATHOLOGY

## 2024-08-13 PROCEDURE — 80061 LIPID PANEL: CPT

## 2024-08-13 PROCEDURE — 36415 COLL VENOUS BLD VENIPUNCTURE: CPT

## 2024-08-13 PROCEDURE — 85025 COMPLETE CBC W/AUTO DIFF WBC: CPT

## 2024-08-13 PROCEDURE — 81339 MPL GENE SEQ ALYS EXON 10: CPT

## 2024-08-13 PROCEDURE — G0452 MOLECULAR PATHOLOGY INTERPR: HCPCS | Performed by: PATHOLOGY

## 2024-08-13 PROCEDURE — 82043 UR ALBUMIN QUANTITATIVE: CPT

## 2024-08-13 PROCEDURE — 82668 ASSAY OF ERYTHROPOIETIN: CPT | Mod: 90

## 2024-08-13 PROCEDURE — 80053 COMPREHEN METABOLIC PANEL: CPT

## 2024-08-13 PROCEDURE — 81219 CALR GENE COM VARIANTS: CPT

## 2024-08-13 PROCEDURE — 99000 SPECIMEN HANDLING OFFICE-LAB: CPT

## 2024-08-13 PROCEDURE — 81338 MPL GENE COMMON VARIANTS: CPT

## 2024-08-13 PROCEDURE — 82570 ASSAY OF URINE CREATININE: CPT

## 2024-08-14 LAB — EPO SERPL-ACNC: 11 MU/ML

## 2024-08-21 LAB
INTERPRETATION: NORMAL
SIGNIFICANT RESULTS: NORMAL
SPECIMEN DESCRIPTION: NORMAL
TEST DETAILS, MDL: NORMAL

## 2024-08-30 ASSESSMENT — ANXIETY QUESTIONNAIRES
GAD7 TOTAL SCORE: 0
GAD7 TOTAL SCORE: 0
7. FEELING AFRAID AS IF SOMETHING AWFUL MIGHT HAPPEN: NOT AT ALL
GAD7 TOTAL SCORE: 0
8. IF YOU CHECKED OFF ANY PROBLEMS, HOW DIFFICULT HAVE THESE MADE IT FOR YOU TO DO YOUR WORK, TAKE CARE OF THINGS AT HOME, OR GET ALONG WITH OTHER PEOPLE?: NOT DIFFICULT AT ALL

## 2024-08-30 ASSESSMENT — PATIENT HEALTH QUESTIONNAIRE - PHQ9
SUM OF ALL RESPONSES TO PHQ QUESTIONS 1-9: 0
SUM OF ALL RESPONSES TO PHQ QUESTIONS 1-9: 0
10. IF YOU CHECKED OFF ANY PROBLEMS, HOW DIFFICULT HAVE THESE PROBLEMS MADE IT FOR YOU TO DO YOUR WORK, TAKE CARE OF THINGS AT HOME, OR GET ALONG WITH OTHER PEOPLE: NOT DIFFICULT AT ALL

## 2024-09-03 ENCOUNTER — OFFICE VISIT (OUTPATIENT)
Dept: FAMILY MEDICINE | Facility: CLINIC | Age: 64
End: 2024-09-03
Payer: COMMERCIAL

## 2024-09-03 ENCOUNTER — MYC MEDICAL ADVICE (OUTPATIENT)
Dept: FAMILY MEDICINE | Facility: CLINIC | Age: 64
End: 2024-09-03

## 2024-09-03 VITALS
RESPIRATION RATE: 16 BRPM | HEART RATE: 51 BPM | OXYGEN SATURATION: 99 % | DIASTOLIC BLOOD PRESSURE: 82 MMHG | BODY MASS INDEX: 25.56 KG/M2 | WEIGHT: 188.7 LBS | HEIGHT: 72 IN | TEMPERATURE: 97 F | SYSTOLIC BLOOD PRESSURE: 144 MMHG

## 2024-09-03 DIAGNOSIS — I47.10 SVT (SUPRAVENTRICULAR TACHYCARDIA) (H): ICD-10-CM

## 2024-09-03 DIAGNOSIS — R91.8 PULMONARY NODULES: ICD-10-CM

## 2024-09-03 DIAGNOSIS — F40.243 FEAR OF FLYING: ICD-10-CM

## 2024-09-03 DIAGNOSIS — N52.9 ERECTILE DYSFUNCTION, UNSPECIFIED ERECTILE DYSFUNCTION TYPE: ICD-10-CM

## 2024-09-03 DIAGNOSIS — I10 HYPERTENSION GOAL BP (BLOOD PRESSURE) < 140/90: Primary | ICD-10-CM

## 2024-09-03 DIAGNOSIS — T75.3XXA MOTION SICKNESS, INITIAL ENCOUNTER: Primary | ICD-10-CM

## 2024-09-03 DIAGNOSIS — I77.810 DILATED AORTIC ROOT (H): ICD-10-CM

## 2024-09-03 PROBLEM — D3A.8 NEUROENDOCRINE TUMOR (H): Status: ACTIVE | Noted: 2024-09-03

## 2024-09-03 PROBLEM — D3A.8 NEUROENDOCRINE TUMOR (H): Status: RESOLVED | Noted: 2024-09-03 | Resolved: 2024-09-03

## 2024-09-03 PROCEDURE — 99214 OFFICE O/P EST MOD 30 MIN: CPT | Mod: 25 | Performed by: FAMILY MEDICINE

## 2024-09-03 PROCEDURE — 90471 IMMUNIZATION ADMIN: CPT | Performed by: FAMILY MEDICINE

## 2024-09-03 PROCEDURE — 90677 PCV20 VACCINE IM: CPT | Performed by: FAMILY MEDICINE

## 2024-09-03 RX ORDER — LISINOPRIL 40 MG/1
40 TABLET ORAL DAILY
Qty: 90 TABLET | Refills: 3 | Status: SHIPPED | OUTPATIENT
Start: 2024-09-03

## 2024-09-03 RX ORDER — LORAZEPAM 0.5 MG/1
.5-1 TABLET ORAL EVERY 6 HOURS PRN
Qty: 20 TABLET | Refills: 0 | Status: SHIPPED | OUTPATIENT
Start: 2024-09-03

## 2024-09-03 RX ORDER — DILTIAZEM HYDROCHLORIDE 300 MG/1
CAPSULE, COATED, EXTENDED RELEASE ORAL
Qty: 90 CAPSULE | Refills: 3 | Status: SHIPPED | OUTPATIENT
Start: 2024-09-03

## 2024-09-03 RX ORDER — SILDENAFIL CITRATE 20 MG/1
20-80 TABLET ORAL DAILY PRN
Qty: 20 TABLET | Refills: 5 | Status: SHIPPED | OUTPATIENT
Start: 2024-09-03

## 2024-09-03 ASSESSMENT — PAIN SCALES - GENERAL: PAINLEVEL: NO PAIN (0)

## 2024-09-03 NOTE — PROGRESS NOTES
"  Assessment & Plan   Discussed blood pressure management.  Will consider adding agent (diuretic vs flomax)  Recent bps 130s/80s. Plans to discuss with cardiology team next month and let me know.  Reviewed recent echo and home blood pressue log.     Pneumonia vaccine today.  Plans shingles from pharmacy.     Sildenafil refill given.   Hypertension goal BP (blood pressure) < 140/90  As above.   - diltiazem ER COATED BEADS (CARDIZEM CD/CARTIA XT) 300 MG 24 hr capsule; TAKE 1 CAPSULE(300 MG) BY MOUTH DAILY  - lisinopril (ZESTRIL) 40 MG tablet; Take 1 tablet (40 mg) by mouth daily.    Dilated aortic root (H24)  Overall stable based on last two echos and cardiac MRI.     SVT (supraventricular tachycardia) (H24)  Symptomatic for 15 years however controlled with diltiazem.     Erectile dysfunction, unspecified erectile dysfunction type  Refill requested/given.   - sildenafil (REVATIO) 20 MG tablet; Take 1-4 tablets (20-80 mg) by mouth daily as needed (erectile dysfunction).    Fear of flying  Same.   - LORazepam (ATIVAN) 0.5 MG tablet; Take 1-2 tablets (0.5-1 mg) by mouth every 6 hours as needed (flying phobia/anxiety).    Pulmonary nodules  See [employer] message after visit.     35 minutes spent on the date of the encounter doing chart review, history and exam, negotiating and explaining the plan with the patient, documentation and further activities as noted above.      The longitudinal plan of care for the diagnosis(es)/condition(s) as documented were addressed during this visit. Due to the added complexity in care, I will continue to support Nba in the subsequent management and with ongoing continuity of care.        BMI  Estimated body mass index is 25.42 kg/m  as calculated from the following:    Height as of this encounter: 1.835 m (6' 0.25\").    Weight as of this encounter: 85.6 kg (188 lb 11.2 oz).             Subjective   Nba is a 64 year old, presenting for the following health issues:  Recheck Medication and " "Hypertension        10/12/2023     2:17 PM   Additional Questions   Roomed by Frankie LYLES     History of Present Illness       Reason for visit:  Blood pressure   He is taking medications regularly.         Hypertension Follow-up    Do you check your blood pressure regularly outside of the clinic? Yes   Are you following a low salt diet? Yes \"trying\"  Are your blood pressures ever more than 140 on the top number (systolic) OR more   than 90 on the bottom number (diastolic), for example 140/90? No (very infrequently)      How many servings of fruits and vegetables do you eat daily?  4 or more  On average, how many sweetened beverages do you drink each day (Examples: soda, juice, sweet tea, etc.  Do NOT count diet or artificially sweetened beverages)?   0  How many days per week do you exercise enough to make your heart beat faster? 6  How many minutes a day do you exercise enough to make your heart beat faster? 60 or more  How many days per week do you miss taking your medication? 0            Objective    BP (!) 144/82   Pulse 51   Temp 97  F (36.1  C) (Temporal)   Resp 16   Ht 1.835 m (6' 0.25\")   Wt 85.6 kg (188 lb 11.2 oz)   SpO2 99%   BMI 25.42 kg/m    Body mass index is 25.42 kg/m .  Physical Exam   Appears well.             Signed Electronically by: Joel Daniel Wegener, MD    "

## 2024-09-03 NOTE — PATIENT INSTRUCTIONS
Discussed blood pressure management.  Will consider adding agent.  Recent bps 130s/80s. Plans to discuss with cardiology team next month and let me know.  Reviewed recent echo and home blood pressue log.     Pneumonia vaccine today.  Plans shingles from pharmacy.     Sildenafil refill given.

## 2024-09-06 RX ORDER — SCOLOPAMINE TRANSDERMAL SYSTEM 1 MG/1
1 PATCH, EXTENDED RELEASE TRANSDERMAL
Qty: 4 PATCH | Refills: 1 | Status: SHIPPED | OUTPATIENT
Start: 2024-09-06

## 2024-09-12 ENCOUNTER — PATIENT OUTREACH (OUTPATIENT)
Dept: CARE COORDINATION | Facility: CLINIC | Age: 64
End: 2024-09-12
Payer: COMMERCIAL

## 2024-09-26 ENCOUNTER — PATIENT OUTREACH (OUTPATIENT)
Dept: CARE COORDINATION | Facility: CLINIC | Age: 64
End: 2024-09-26
Payer: COMMERCIAL

## 2024-10-02 ENCOUNTER — OFFICE VISIT (OUTPATIENT)
Dept: CARDIOLOGY | Facility: CLINIC | Age: 64
End: 2024-10-02
Attending: INTERNAL MEDICINE
Payer: COMMERCIAL

## 2024-10-02 VITALS
DIASTOLIC BLOOD PRESSURE: 89 MMHG | SYSTOLIC BLOOD PRESSURE: 136 MMHG | WEIGHT: 190.9 LBS | HEART RATE: 51 BPM | OXYGEN SATURATION: 100 % | BODY MASS INDEX: 25.71 KG/M2

## 2024-10-02 DIAGNOSIS — I10 ESSENTIAL HYPERTENSION: ICD-10-CM

## 2024-10-02 DIAGNOSIS — I47.10 PAROXYSMAL SUPRAVENTRICULAR TACHYCARDIA (H): Primary | ICD-10-CM

## 2024-10-02 DIAGNOSIS — I77.810 DILATED AORTIC ROOT (H): ICD-10-CM

## 2024-10-02 PROCEDURE — 99213 OFFICE O/P EST LOW 20 MIN: CPT | Performed by: INTERNAL MEDICINE

## 2024-10-02 PROCEDURE — 99203 OFFICE O/P NEW LOW 30 MIN: CPT | Performed by: INTERNAL MEDICINE

## 2024-10-02 ASSESSMENT — PAIN SCALES - GENERAL: PAINLEVEL: NO PAIN (0)

## 2024-10-02 NOTE — LETTER
10/2/2024      RE: Robin Olmedo  [address] [place] [facility] [address]       Dear Colleague,    Thank you for the opportunity to participate in the care of your patient, Robin Olmedo, at the [facility] [place] [facility]. Please see a copy of my visit note below.    I am delighted to see Robin Olmedo in consultation.The primary encounter diagnosis was Paroxysmal supraventricular tachycardia (H). Diagnoses of Essential hypertension and Dilated aortic root (H) were also pertinent to this visit.   As you know, the patient is a 64 year old  male. He   has a past medical history of Arthritis, Hypertension, Palpitations, and SVT (supraventricular tachycardia) (H)..    On this visit, the patient states that he has excellent exercise tolerance and occasional palpitations.  The patient denies chest pressure/discomfort, dyspnea, near-syncope, syncope, orthopnea, paroxysmal nocturnal dyspnea, and lower extermity edema.    The patient's cardiovascular risk factors include hypertension.    The following portions of the patient's history were reviewed and updated as appropriate: allergies, current medications, past family history, past medical history, past social history, past surgical history, and the problem list.    PMH: The patient's past medical history includes:    Past Medical History:   Diagnosis Date     Arthritis      Hypertension      Palpitations      SVT (supraventricular tachycardia) (H)       Past Surgical History:   Procedure Laterality Date     ARTHROSCOPY WRIST Left 6/17/2011    Procedure: ARTHROSCOPY, WRIST;  Surgeon: Lizzy Cervantes MD;  Location:  OR     COLONOSCOPY  2009    nl     COLONOSCOPY N/A 3/11/2022    Procedure: COLONOSCOPY, WITH POLYPECTOMY;  Surgeon: Champ Llamas MD;  Location: [facility] [place]     OPEN REDUCTION INTERNAL FIXATION WRIST Left 6/17/2011    Procedure: OPEN REDUCTION INTERNAL  FIXATION, FRACTURE, WRIST;  Surgeon: Tatiana Castro MD;  Location: [facility]     ORTHOPEDIC SURGERY       REMOVE HARDWARE WRIST  1/12/2012    Procedure:REMOVE HARDWARE WRIST; Hardware Removal Left Wrist, Plate x 2, Screw  x 10 ; Surgeon:TATIANA CASTRO; Location:[facility]       The patient's medications as of the current encounter are:     Current Outpatient Medications   Medication Sig Dispense Refill     diltiazem ER COATED BEADS (CARDIZEM CD/CARTIA XT) 300 MG 24 hr capsule TAKE 1 CAPSULE(300 MG) BY MOUTH DAILY 90 capsule 3     lisinopril (ZESTRIL) 40 MG tablet Take 1 tablet (40 mg) by mouth daily. 90 tablet 3     LORazepam (ATIVAN) 0.5 MG tablet Take 1-2 tablets (0.5-1 mg) by mouth every 6 hours as needed (flying phobia/anxiety). 20 tablet 0     sildenafil (REVATIO) 20 MG tablet Take 1-4 tablets (20-80 mg) by mouth daily as needed (erectile dysfunction). 20 tablet 5     scopolamine (TRANSDERM) 1 MG/3DAYS 72 hr patch Place 1 patch over 72 hours onto the skin every 72 hours. (Patient not taking: Reported on 10/2/2024) 4 patch 1       Labs:     Lab on 08/13/2024   Component Date Value Ref Range Status     Specimen Description 08/13/2024    Final                    Value:This result contains rich text formatting which cannot be displayed here.     Significant Results 08/13/2024    Final                    Value:This result contains rich text formatting which cannot be displayed here.     Interpretation 08/13/2024    Final                    Value:This result contains rich text formatting which cannot be displayed here.     Test Details 08/13/2024    Final                    Value:This result contains rich text formatting which cannot be displayed here.     Erythropoietin 08/13/2024 11  4 - 27 mU/mL Final    Comment: INTERPRETIVE INFORMATION: Erythropoietin  Normal serum concentrations of erythropoietin for 95% of   individuals with normal hematocrits range from 4-27 mU/mL.    As the hematocrit is lowered  by iron deficiency, aplastic,   or hemolytic anemia, the concentration of erythropoietin   increases as shown in the graph below. In the absence of   anemia, elevated concentrations are seen in renal tumors,   as a manifestation of renal transplant rejection, and in   secondary polycythemia. Low values may be observed in   hemochromatosis.          Expected Erythropoietin Concentrations in Patients                     with Uncomplicated Anemia       Erythropoietin (mU/mL)                100,000 - +                          +                 10,000 - +.......                          + .......                  1,000 - +    .......                          +     ........                    100 - +       ........                          +        ........                     10 - +                                     ........                          +---+---+---+---+---+---+                         10   20  30  40  50  60  70                                (Hematocrit %)              (Contributions To Nephrology 1988:66:54-62)    Decreased erythropoietin concentrations with an elevated   hematocrit are observed in patients with polycythemia rubra   vera, and with a decreased hematocrit in patients with HIV   infection who are receiving AZT.  Patients on AZT who have   anemia and erythropoietin concentrations of less than or   equal to 500 mU/mL may benefit from therapy with   recombinant EPO ([facility] [id number],1990).  Performed By: [facility]  [address] [place] [address]  : Georges Barnes MD, PhD  IA Number: [id number]     Hold Specimen 08/13/2024 [facility]   Final     Hold Specimen 08/13/2024 [facility]   Final     Hold Specimen 08/13/2024 [facility]   Final     Cholesterol 08/13/2024 197  <200 mg/dL Final     Triglycerides 08/13/2024 50  <150 mg/dL Final     Direct Measure HDL 08/13/2024 86  >=40 mg/dL Final     LDL Cholesterol Calculated 08/13/2024 101 (H)  <=100 mg/dL Final     Non HDL  Cholesterol 08/13/2024 111  <130 mg/dL Final     Patient Fasting > 8hrs? 08/13/2024 Yes   Final     Creatinine Urine mg/dL 08/13/2024 120.0  mg/dL Final    The reference ranges have not been established in urine creatinine. The results should be integrated into the clinical context for interpretation.     Albumin Urine mg/L 08/13/2024 <12.0  mg/L Final    The reference ranges have not been established in urine albumin. The results should be integrated into the clinical context for interpretation.     Albumin Urine mg/g Cr 08/13/2024    Final    Unable to calculate, urine albumin and/or urine creatinine is outside detectable limits.  Microalbuminuria is defined as an albumin:creatinine ratio of 17 to 299 for males and 25 to 299 for females. A ratio of albumin:creatinine of 300 or higher is indicative of overt proteinuria.  Due to biologic variability, positive results should be confirmed by a second, first-morning random or 24-hour timed urine specimen. If there is discrepancy, a third specimen is recommended. When 2 out of 3 results are in the microalbuminuria range, this is evidence for incipient nephropathy and warrants increased efforts at glucose control, blood pressure control, and institution of therapy with an angiotensin-converting-enzyme (ACE) inhibitor (if the patient can tolerate it).       Sodium 08/13/2024 139  135 - 145 mmol/L Final     Potassium 08/13/2024 4.8  3.4 - 5.3 mmol/L Final     Carbon Dioxide (CO2) 08/13/2024 24  22 - 29 mmol/L Final     Anion Gap 08/13/2024 9  7 - 15 mmol/L Final     Urea Nitrogen 08/13/2024 14.8  8.0 - 23.0 mg/dL Final     Creatinine 08/13/2024 0.95  0.67 - 1.17 mg/dL Final     GFR Estimate 08/13/2024 89  >60 mL/min/1.73m2 Final    eGFR calculated using 2021 CKD-EPI equation.     Calcium 08/13/2024 9.1  8.8 - 10.4 mg/dL Final    Reference intervals for this test were updated on 7/16/2024 to reflect our healthy population more accurately. There may be differences in the  flagging of prior results with similar values performed with this method. Those prior results can be interpreted in the context of the updated reference intervals.     Chloride 08/13/2024 106  98 - 107 mmol/L Final     Glucose 08/13/2024 98  70 - 99 mg/dL Final     Alkaline Phosphatase 08/13/2024 70  40 - 150 U/L Final     AST 08/13/2024 29  0 - 45 U/L Final     ALT 08/13/2024 18  0 - 70 U/L Final     Protein Total 08/13/2024 6.6  6.4 - 8.3 g/dL Final     Albumin 08/13/2024 4.1  3.5 - 5.2 g/dL Final     Bilirubin Total 08/13/2024 0.4  <=1.2 mg/dL Final     Patient Fasting > 8hrs? 08/13/2024 Yes   Final     WBC Count 08/13/2024 4.9  4.0 - 11.0 10e3/uL Final     RBC Count 08/13/2024 4.94  4.40 - 5.90 10e6/uL Final     Hemoglobin 08/13/2024 15.4  13.3 - 17.7 g/dL Final     Hematocrit 08/13/2024 45.0  40.0 - 53.0 % Final     MCV 08/13/2024 91  78 - 100 fL Final     MCH 08/13/2024 31.2  26.5 - 33.0 pg Final     MCHC 08/13/2024 34.2  31.5 - 36.5 g/dL Final     RDW 08/13/2024 11.6  10.0 - 15.0 % Final     Platelet Count 08/13/2024 186  150 - 450 10e3/uL Final     % Neutrophils 08/13/2024 48  % Final     % Lymphocytes 08/13/2024 36  % Final     % Monocytes 08/13/2024 13  % Final     % Eosinophils 08/13/2024 3  % Final     % Basophils 08/13/2024 0  % Final     % Immature Granulocytes 08/13/2024 0  % Final     Absolute Neutrophils 08/13/2024 2.3  1.6 - 8.3 10e3/uL Final     Absolute Lymphocytes 08/13/2024 1.8  0.8 - 5.3 10e3/uL Final     Absolute Monocytes 08/13/2024 0.6  0.0 - 1.3 10e3/uL Final     Absolute Eosinophils 08/13/2024 0.2  0.0 - 0.7 10e3/uL Final     Absolute Basophils 08/13/2024 0.0  0.0 - 0.2 10e3/uL Final     Absolute Immature Granulocytes 08/13/2024 0.0  <=0.4 10e3/uL Final     Specimen Description 08/13/2024    Final                    Value:This result contains rich text formatting which cannot be displayed here.     Significant Results 08/13/2024    Final                    Value:This result contains rich  text formatting which cannot be displayed here.     Interpretation 08/13/2024    Final                    Value:This result contains rich text formatting which cannot be displayed here.     Test Details 08/13/2024    Final                    Value:This result contains rich text formatting which cannot be displayed here.       Allergies:  No Known Allergies    Family History:   Family History   Problem Relation Age of Onset     Arrhythmia Mother      Ovarian Cancer Mother      Arrhythmia Father      No Known Problems Brother      Hypertension Brother      Atrial fibrillation Brother      No Known Problems Son      No Known Problems Daughter      Melanoma No family hx of      Skin Cancer No family hx of        Psychosocial history:  reports that he has never smoked. He quit smokeless tobacco use about 24 years ago. He reports current alcohol use. He reports that he does not use drugs.    Review of systems: negative for, exertional chest pain or pressure, paroxysmal nocturnal dyspnea, dyspnea on exertion, orthopnea, lower extremity edema, syncope or near-syncope, and exercise intolerance    In addition,   General: No change in weight, sleep or appetite.  Normal energy.  No fever or chills  Eyes: Negative for vision changes or eye problems  ENT: No problems with ears, nose or throat.  No difficulty swallowing.  Resp: No coughing, wheezing or shortness of breath; pulmonary nodules  GI: No nausea, vomiting,  heartburn, abdominal pain, diarrhea, constipation or change in bowel habits  : No urinary frequency or dysuria, bladder or kidney problems  Musculoskeletal: No significant muscle or joint pains  Neurologic: No headaches, numbness, tingling, weakness, problems with balance or coordination  Psychiatric: No problems with anxiety, depression or mental health  Heme/immune/allergy: intermittent benign polycythemia  Endocrine: No history of thyroid disease, diabetes or other endocrine disorders  Skin: No rashes,worrisome  lesions or skin problems  Vascular:  No claudication, lifestyle limiting or otherwise; no ischemic rest pain; no non-healing ulcers. No weakness, No loss of sensation        Physical examination  Vitals: /89 (BP Location: Left arm, Patient Position: Chair, Cuff Size: Adult Regular)   Pulse 51   Wt 86.6 kg (190 lb 14.4 oz)   SpO2 100%   BMI 25.71 kg/m    BMI= Body mass index is 25.71 kg/m .    In general, the patient is a pleasant male in no apparent distress.    HEENT: Normiocephalic and atraumatic.  PERRLA.  EOMI.  Sclerae white, not injected.      Neck:  No jugular venous distension.   Heart:  Regular rate and rhythm. Normal S1, S2 splits physiologically. No murmur, rub, click, or gallop.    Abdomen: Soft, nondistended.    Extremities: No clubbing, cyanosis, or edema.   Neurological: The neurological examination reveal a patient who was oriented to person, place, and time.  The remainder of the examination was nonfocal.    Cardiac tests include:    11/30/23 - zio - occ nonsustaine SVT  7/2/24 - echo - EF normal, no RWMA, aorta 4.6 cm, no significant change  11/30/23 Ca score - zero  4/7/22 MRA - Ao root 4.3 cm      Assessment and Plan    HTN - on diltiazem, lisinopril  Paroxysmal SVT - on diltiazem  Ao dilation - will follow in a year    The patient is to return  prn. The patient understood the treatment plan as outlined above.  There were no barriers to learning.      Miko Carranza MD     Please do not hesitate to contact me if you have any questions/concerns.     Sincerely,     Miko Carranza MD

## 2024-10-02 NOTE — PROGRESS NOTES
I am delighted to see Robin Olmedo in consultation.The primary encounter diagnosis was Paroxysmal supraventricular tachycardia (H). Diagnoses of Essential hypertension and Dilated aortic root (H) were also pertinent to this visit.   As you know, the patient is a 64 year old  male. He   has a past medical history of Arthritis, Hypertension, Palpitations, and SVT (supraventricular tachycardia) (H)..    On this visit, the patient states that he has excellent exercise tolerance and occasional palpitations.  The patient denies chest pressure/discomfort, dyspnea, near-syncope, syncope, orthopnea, paroxysmal nocturnal dyspnea, and lower extermity edema.    The patient's cardiovascular risk factors include hypertension.    The following portions of the patient's history were reviewed and updated as appropriate: allergies, current medications, past family history, past medical history, past social history, past surgical history, and the problem list.    PMH: The patient's past medical history includes:    Past Medical History:   Diagnosis Date    Arthritis     Hypertension     Palpitations     SVT (supraventricular tachycardia) (H)       Past Surgical History:   Procedure Laterality Date    ARTHROSCOPY WRIST Left 6/17/2011    Procedure: ARTHROSCOPY, WRIST;  Surgeon: Tatiana Castro MD;  Location:  OR    COLONOSCOPY  2009    nl    COLONOSCOPY N/A 3/11/2022    Procedure: COLONOSCOPY, WITH POLYPECTOMY;  Surgeon: Champ Llamas MD;  Location: [facility] OR    OPEN REDUCTION INTERNAL FIXATION WRIST Left 6/17/2011    Procedure: OPEN REDUCTION INTERNAL FIXATION, FRACTURE, WRIST;  Surgeon: Tatiana Castro MD;  Location:  OR    ORTHOPEDIC SURGERY      REMOVE HARDWARE WRIST  1/12/2012    Procedure:REMOVE HARDWARE WRIST; Hardware Removal Left Wrist, Plate x 2, Screw  x 10 ; Surgeon:TATIANA CASTRO; Location:[facility]       The patient's medications as of the current encounter are:     Current  Outpatient Medications   Medication Sig Dispense Refill    diltiazem ER COATED BEADS (CARDIZEM CD/CARTIA XT) 300 MG 24 hr capsule TAKE 1 CAPSULE(300 MG) BY MOUTH DAILY 90 capsule 3    lisinopril (ZESTRIL) 40 MG tablet Take 1 tablet (40 mg) by mouth daily. 90 tablet 3    LORazepam (ATIVAN) 0.5 MG tablet Take 1-2 tablets (0.5-1 mg) by mouth every 6 hours as needed (flying phobia/anxiety). 20 tablet 0    sildenafil (REVATIO) 20 MG tablet Take 1-4 tablets (20-80 mg) by mouth daily as needed (erectile dysfunction). 20 tablet 5    scopolamine (TRANSDERM) 1 MG/3DAYS 72 hr patch Place 1 patch over 72 hours onto the skin every 72 hours. (Patient not taking: Reported on 10/2/2024) 4 patch 1       Labs:     Lab on 08/13/2024   Component Date Value Ref Range Status    Specimen Description 08/13/2024    Final                    Value:This result contains rich text formatting which cannot be displayed here.    Significant Results 08/13/2024    Final                    Value:This result contains rich text formatting which cannot be displayed here.    Interpretation 08/13/2024    Final                    Value:This result contains rich text formatting which cannot be displayed here.    Test Details 08/13/2024    Final                    Value:This result contains rich text formatting which cannot be displayed here.    Erythropoietin 08/13/2024 11  4 - 27 mU/mL Final    Comment: INTERPRETIVE INFORMATION: Erythropoietin  Normal serum concentrations of erythropoietin for 95% of   individuals with normal hematocrits range from 4-27 mU/mL.    As the hematocrit is lowered by iron deficiency, aplastic,   or hemolytic anemia, the concentration of erythropoietin   increases as shown in the graph below. In the absence of   anemia, elevated concentrations are seen in renal tumors,   as a manifestation of renal transplant rejection, and in   secondary polycythemia. Low values may be observed in   hemochromatosis.          Expected  Erythropoietin Concentrations in Patients                     with Uncomplicated Anemia       Erythropoietin (mU/mL)                100,000 - +                          +                 10,000 - +.......                          + .......                  1,000 - +    .......                          +     ........                    100 - +       ........                          +        ........                     10 - +                                     ........                          +---+---+---+---+---+---+                         10   20  30  40  50  60  70                                (Hematocrit %)              (Contributions To Nephrology 1988:66:[id number])    Decreased erythropoietin concentrations with an elevated   hematocrit are observed in patients with polycythemia rubra   vera, and with a decreased hematocrit in patients with HIV   infection who are receiving AZT.  Patients on AZT who have   anemia and erythropoietin concentrations of less than or   equal to 500 mU/mL may benefit from therapy with   recombinant EPO ([facility] [id number]).  Performed By: [facility]  [address] [place] [id number]  : Georges Barnes MD, PhD  CLIA Number: [id number]    Hold Specimen 08/13/2024 JI   Final    Hold Specimen 08/13/2024 JI   Final    Hold Specimen 08/13/2024 [facility]   Final    Cholesterol 08/13/2024 197  <200 mg/dL Final    Triglycerides 08/13/2024 50  <150 mg/dL Final    Direct Measure HDL 08/13/2024 86  >=40 mg/dL Final    LDL Cholesterol Calculated 08/13/2024 101 (H)  <=100 mg/dL Final    Non HDL Cholesterol 08/13/2024 111  <130 mg/dL Final    Patient Fasting > 8hrs? 08/13/2024 Yes   Final    Creatinine Urine mg/dL 08/13/2024 120.0  mg/dL Final    The reference ranges have not been established in urine creatinine. The results should be integrated into the clinical context for interpretation.    Albumin Urine mg/L 08/13/2024 <12.0  mg/L Final    The reference  ranges have not been established in urine albumin. The results should be integrated into the clinical context for interpretation.    Albumin Urine mg/g Cr 08/13/2024    Final    Unable to calculate, urine albumin and/or urine creatinine is outside detectable limits.  Microalbuminuria is defined as an albumin:creatinine ratio of 17 to 299 for males and 25 to 299 for females. A ratio of albumin:creatinine of 300 or higher is indicative of overt proteinuria.  Due to biologic variability, positive results should be confirmed by a second, first-morning random or 24-hour timed urine specimen. If there is discrepancy, a third specimen is recommended. When 2 out of 3 results are in the microalbuminuria range, this is evidence for incipient nephropathy and warrants increased efforts at glucose control, blood pressure control, and institution of therapy with an angiotensin-converting-enzyme (ACE) inhibitor (if the patient can tolerate it).      Sodium 08/13/2024 139  135 - 145 mmol/L Final    Potassium 08/13/2024 4.8  3.4 - 5.3 mmol/L Final    Carbon Dioxide (CO2) 08/13/2024 24  22 - 29 mmol/L Final    Anion Gap 08/13/2024 9  7 - 15 mmol/L Final    Urea Nitrogen 08/13/2024 14.8  8.0 - 23.0 mg/dL Final    Creatinine 08/13/2024 0.95  0.67 - 1.17 mg/dL Final    GFR Estimate 08/13/2024 89  >60 mL/min/1.73m2 Final    eGFR calculated using 2021 CKD-EPI equation.    Calcium 08/13/2024 9.1  8.8 - 10.4 mg/dL Final    Reference intervals for this test were updated on 7/16/2024 to reflect our healthy population more accurately. There may be differences in the flagging of prior results with similar values performed with this method. Those prior results can be interpreted in the context of the updated reference intervals.    Chloride 08/13/2024 106  98 - 107 mmol/L Final    Glucose 08/13/2024 98  70 - 99 mg/dL Final    Alkaline Phosphatase 08/13/2024 70  40 - 150 U/L Final    AST 08/13/2024 29  0 - 45 U/L Final    ALT 08/13/2024 18  0 -  70 U/L Final    Protein Total 08/13/2024 6.6  6.4 - 8.3 g/dL Final    Albumin 08/13/2024 4.1  3.5 - 5.2 g/dL Final    Bilirubin Total 08/13/2024 0.4  <=1.2 mg/dL Final    Patient Fasting > 8hrs? 08/13/2024 Yes   Final    WBC Count 08/13/2024 4.9  4.0 - 11.0 10e3/uL Final    RBC Count 08/13/2024 4.94  4.40 - 5.90 10e6/uL Final    Hemoglobin 08/13/2024 15.4  13.3 - 17.7 g/dL Final    Hematocrit 08/13/2024 45.0  40.0 - 53.0 % Final    MCV 08/13/2024 91  78 - 100 fL Final    MCH 08/13/2024 31.2  26.5 - 33.0 pg Final    MCHC 08/13/2024 34.2  31.5 - 36.5 g/dL Final    RDW 08/13/2024 11.6  10.0 - 15.0 % Final    Platelet Count 08/13/2024 186  150 - 450 10e3/uL Final    % Neutrophils 08/13/2024 48  % Final    % Lymphocytes 08/13/2024 36  % Final    % Monocytes 08/13/2024 13  % Final    % Eosinophils 08/13/2024 3  % Final    % Basophils 08/13/2024 0  % Final    % Immature Granulocytes 08/13/2024 0  % Final    Absolute Neutrophils 08/13/2024 2.3  1.6 - 8.3 10e3/uL Final    Absolute Lymphocytes 08/13/2024 1.8  0.8 - 5.3 10e3/uL Final    Absolute Monocytes 08/13/2024 0.6  0.0 - 1.3 10e3/uL Final    Absolute Eosinophils 08/13/2024 0.2  0.0 - 0.7 10e3/uL Final    Absolute Basophils 08/13/2024 0.0  0.0 - 0.2 10e3/uL Final    Absolute Immature Granulocytes 08/13/2024 0.0  <=0.4 10e3/uL Final    Specimen Description 08/13/2024    Final                    Value:This result contains rich text formatting which cannot be displayed here.    Significant Results 08/13/2024    Final                    Value:This result contains rich text formatting which cannot be displayed here.    Interpretation 08/13/2024    Final                    Value:This result contains rich text formatting which cannot be displayed here.    Test Details 08/13/2024    Final                    Value:This result contains rich text formatting which cannot be displayed here.       Allergies:  No Known Allergies    Family History:   Family History   Problem Relation Age  of Onset    Arrhythmia Mother     Ovarian Cancer Mother     Arrhythmia Father     No Known Problems Brother     Hypertension Brother     Atrial fibrillation Brother     No Known Problems Son     No Known Problems Daughter     Melanoma No family hx of     Skin Cancer No family hx of        Psychosocial history:  reports that he has never smoked. He quit smokeless tobacco use about 24 years ago. He reports current alcohol use. He reports that he does not use drugs.    Review of systems: negative for, exertional chest pain or pressure, paroxysmal nocturnal dyspnea, dyspnea on exertion, orthopnea, lower extremity edema, syncope or near-syncope, and exercise intolerance    In addition,   General: No change in weight, sleep or appetite.  Normal energy.  No fever or chills  Eyes: Negative for vision changes or eye problems  ENT: No problems with ears, nose or throat.  No difficulty swallowing.  Resp: No coughing, wheezing or shortness of breath; pulmonary nodules  GI: No nausea, vomiting,  heartburn, abdominal pain, diarrhea, constipation or change in bowel habits  : No urinary frequency or dysuria, bladder or kidney problems  Musculoskeletal: No significant muscle or joint pains  Neurologic: No headaches, numbness, tingling, weakness, problems with balance or coordination  Psychiatric: No problems with anxiety, depression or mental health  Heme/immune/allergy: intermittent benign polycythemia  Endocrine: No history of thyroid disease, diabetes or other endocrine disorders  Skin: No rashes,worrisome lesions or skin problems  Vascular:  No claudication, lifestyle limiting or otherwise; no ischemic rest pain; no non-healing ulcers. No weakness, No loss of sensation        Physical examination  Vitals: /89 (BP Location: Left arm, Patient Position: Chair, Cuff Size: Adult Regular)   Pulse 51   Wt 86.6 kg (190 lb 14.4 oz)   SpO2 100%   BMI 25.71 kg/m    BMI= Body mass index is 25.71 kg/m .    In general, the patient  is a pleasant male in no apparent distress.    HEENT: Normiocephalic and atraumatic.  PERRLA.  EOMI.  Sclerae white, not injected.      Neck:  No jugular venous distension.   Heart:  Regular rate and rhythm. Normal S1, S2 splits physiologically. No murmur, rub, click, or gallop.    Abdomen: Soft, nondistended.    Extremities: No clubbing, cyanosis, or edema.   Neurological: The neurological examination reveal a patient who was oriented to person, place, and time.  The remainder of the examination was nonfocal.    Cardiac tests include:    11/30/23 - zio - occ nonsustaine SVT  7/2/24 - echo - EF normal, no RWMA, aorta 4.6 cm, no significant change  11/30/23 Ca score - zero  4/7/22 MRA - Ao root 4.3 cm      Assessment and Plan    HTN - on diltiazem, lisinopril  Paroxysmal SVT - on diltiazem  Ao dilation - will follow in a year    The patient is to return  prn. The patient understood the treatment plan as outlined above.  There were no barriers to learning.      Miko Carranza MD

## 2024-10-02 NOTE — NURSING NOTE
Chief Complaint   Patient presents with    New Patient     new - self referred       Vitals were taken, medications reconciled.    Adonay Chase, Clinic Assistant   8:25 AM

## 2024-11-18 ENCOUNTER — HOSPITAL ENCOUNTER (OUTPATIENT)
Dept: CT IMAGING | Facility: CLINIC | Age: 64
Discharge: HOME | End: 2024-11-18
Attending: FAMILY MEDICINE | Admitting: FAMILY MEDICINE
Payer: COMMERCIAL

## 2024-11-18 DIAGNOSIS — R91.8 PULMONARY NODULES: ICD-10-CM

## 2024-11-18 PROCEDURE — 71250 CT THORAX DX C-: CPT | Mod: 26 | Performed by: RADIOLOGY

## 2024-11-18 PROCEDURE — 71250 CT THORAX DX C-: CPT

## 2024-12-21 ENCOUNTER — HEALTH MAINTENANCE LETTER (OUTPATIENT)
Age: 64
End: 2024-12-21

## 2025-04-10 ENCOUNTER — PATIENT OUTREACH (OUTPATIENT)
Dept: CARE COORDINATION | Facility: CLINIC | Age: 65
End: 2025-04-10
Payer: COMMERCIAL

## 2025-07-21 DIAGNOSIS — I10 HYPERTENSION GOAL BP (BLOOD PRESSURE) < 140/90: ICD-10-CM

## 2025-07-21 RX ORDER — LISINOPRIL 40 MG/1
40 TABLET ORAL DAILY
Qty: 90 TABLET | Refills: 3 | OUTPATIENT
Start: 2025-07-21

## 2025-07-21 RX ORDER — DILTIAZEM HYDROCHLORIDE 300 MG/1
CAPSULE, COATED, EXTENDED RELEASE ORAL
Qty: 90 CAPSULE | Refills: 3 | OUTPATIENT
Start: 2025-07-21

## 2025-07-28 DIAGNOSIS — I10 HYPERTENSION GOAL BP (BLOOD PRESSURE) < 140/90: ICD-10-CM

## 2025-07-28 RX ORDER — LISINOPRIL 40 MG/1
40 TABLET ORAL DAILY
Qty: 90 TABLET | Refills: 0 | Status: SHIPPED | OUTPATIENT
Start: 2025-07-28

## 2025-07-28 RX ORDER — DILTIAZEM HYDROCHLORIDE 300 MG/1
CAPSULE, COATED, EXTENDED RELEASE ORAL
Qty: 90 CAPSULE | Refills: 3 | OUTPATIENT
Start: 2025-07-28

## 2025-08-04 DIAGNOSIS — I10 HYPERTENSION GOAL BP (BLOOD PRESSURE) < 140/90: ICD-10-CM

## 2025-08-05 RX ORDER — DILTIAZEM HYDROCHLORIDE 300 MG/1
CAPSULE, COATED, EXTENDED RELEASE ORAL
Qty: 90 CAPSULE | Refills: 0 | Status: SHIPPED | OUTPATIENT
Start: 2025-08-05

## (undated) DEVICE — ENDO TRAP POLYP E-TRAP 00711099

## (undated) DEVICE — TUBING SUCTION 12"X1/4" N612

## (undated) DEVICE — SPECIMEN CONTAINER 3OZ W/FORMALIN 59901

## (undated) DEVICE — SOL WATER IRRIG 500ML BOTTLE 2F7113

## (undated) DEVICE — GOWN IMPERVIOUS 2XL BLUE

## (undated) DEVICE — SUCTION MANIFOLD NEPTUNE 2 SYS 1 PORT 702-025-000

## (undated) DEVICE — PAD CHUX UNDERPAD 30X30"

## (undated) DEVICE — ENDO SNARE EXACTO COLD 9MM LOOP 2.4MMX230CM 00711115

## (undated) RX ORDER — FENTANYL CITRATE 50 UG/ML
INJECTION, SOLUTION INTRAMUSCULAR; INTRAVENOUS
Status: DISPENSED
Start: 2022-03-11

## (undated) RX ORDER — FENTANYL CITRATE 50 UG/ML
INJECTION, SOLUTION INTRAMUSCULAR; INTRAVENOUS
Status: DISPENSED
Start: 2022-06-17